# Patient Record
Sex: FEMALE | Race: WHITE | NOT HISPANIC OR LATINO | Employment: FULL TIME | URBAN - METROPOLITAN AREA
[De-identification: names, ages, dates, MRNs, and addresses within clinical notes are randomized per-mention and may not be internally consistent; named-entity substitution may affect disease eponyms.]

---

## 2018-06-12 ENCOUNTER — OFFICE VISIT (OUTPATIENT)
Dept: INTERNAL MEDICINE | Facility: CLINIC | Age: 28
End: 2018-06-12
Payer: COMMERCIAL

## 2018-06-12 ENCOUNTER — HOSPITAL ENCOUNTER (OUTPATIENT)
Dept: RADIOLOGY | Facility: HOSPITAL | Age: 28
Discharge: HOME | End: 2018-06-12
Attending: FAMILY MEDICINE
Payer: COMMERCIAL

## 2018-06-12 VITALS
WEIGHT: 143.75 LBS | DIASTOLIC BLOOD PRESSURE: 86 MMHG | RESPIRATION RATE: 20 BRPM | SYSTOLIC BLOOD PRESSURE: 138 MMHG | BODY MASS INDEX: 24.54 KG/M2 | TEMPERATURE: 99 F | HEIGHT: 64 IN | OXYGEN SATURATION: 100 % | HEART RATE: 100 BPM

## 2018-06-12 DIAGNOSIS — R00.0 RACING HEART BEAT: ICD-10-CM

## 2018-06-12 DIAGNOSIS — R10.2 PELVIC PAIN: ICD-10-CM

## 2018-06-12 DIAGNOSIS — K92.1 BLOOD IN STOOL: ICD-10-CM

## 2018-06-12 DIAGNOSIS — Z76.89 ESTABLISHING CARE WITH NEW DOCTOR, ENCOUNTER FOR: ICD-10-CM

## 2018-06-12 DIAGNOSIS — R10.30 LOWER ABDOMINAL PAIN: Primary | ICD-10-CM

## 2018-06-12 DIAGNOSIS — R10.30 LOWER ABDOMINAL PAIN: ICD-10-CM

## 2018-06-12 PROCEDURE — 74019 RADEX ABDOMEN 2 VIEWS: CPT | Mod: 26,,, | Performed by: RADIOLOGY

## 2018-06-12 PROCEDURE — 74019 RADEX ABDOMEN 2 VIEWS: CPT | Mod: TC

## 2018-06-12 PROCEDURE — 3008F BODY MASS INDEX DOCD: CPT | Mod: S$GLB,,, | Performed by: FAMILY MEDICINE

## 2018-06-12 PROCEDURE — 99999 PR PBB SHADOW E&M-NEW PATIENT-LVL IV: CPT | Mod: PBBFAC,,, | Performed by: FAMILY MEDICINE

## 2018-06-12 PROCEDURE — 99203 OFFICE O/P NEW LOW 30 MIN: CPT | Mod: S$GLB,,, | Performed by: FAMILY MEDICINE

## 2018-06-12 RX ORDER — PROMETHAZINE HYDROCHLORIDE 12.5 MG/1
TABLET ORAL
COMMUNITY
Start: 2018-02-20 | End: 2021-09-20

## 2018-06-12 RX ORDER — DICLOFENAC SODIUM 50 MG/1
TABLET, DELAYED RELEASE ORAL
Refills: 2 | COMMUNITY
Start: 2018-05-14 | End: 2021-09-20

## 2018-06-12 RX ORDER — MILNACIPRAN HYDROCHLORIDE 50 MG/1
TABLET, FILM COATED ORAL
Refills: 2 | COMMUNITY
Start: 2018-06-07 | End: 2020-09-08

## 2018-06-12 RX ORDER — AMITRIPTYLINE HYDROCHLORIDE 50 MG/1
50 TABLET, FILM COATED ORAL
COMMUNITY
End: 2022-05-10 | Stop reason: SDUPTHER

## 2018-06-12 RX ORDER — CIPROFLOXACIN 250 MG/1
TABLET, FILM COATED ORAL
Refills: 0 | COMMUNITY
Start: 2018-06-08 | End: 2018-08-23 | Stop reason: ALTCHOICE

## 2018-06-12 RX ORDER — OSELTAMIVIR PHOSPHATE 75 MG/1
CAPSULE ORAL
Refills: 0 | COMMUNITY
Start: 2018-05-14 | End: 2018-08-23 | Stop reason: ALTCHOICE

## 2018-06-12 RX ORDER — CHOLECALCIFEROL (VITAMIN D3) 25 MCG
2000 TABLET ORAL
COMMUNITY
End: 2018-11-16

## 2018-06-12 NOTE — PROGRESS NOTES
Subjective:       Patient ID: Shagufta Bedoya is a 27 y.o. female.    Chief Complaint: Establish Care (bleeding w bm)    HPI Ms. Bedoya presents today to establish care. She has a medical history as listed below.   She complains today of blood with BM.   Bright red blood     Had a menstrual 2 weeks long afterwards continued to have cramps. Intensity comes and goes but is always there.     Normal BM a week and a half. Last time was difficult to come.   Last couple weeks felt it cut coming out blood on tissue. 1 week ago some drips of blood.   Yesterday toilet filled with blood checked to make sure if it was menstrual and it wasn't.   Small stool last night and blood in toilet.     Miralax daily since last week stopped for 3 days and restarted. Stool softener 2 nights a go.     GI appt is 6/25/2018  Dr. Cardona    [facility] scheduled 22nd of this month     Review of Systems   Constitutional: Negative for activity change, appetite change, fatigue and fever.   HENT: Negative for congestion, ear pain and sinus pressure.    Eyes: Negative for pain and visual disturbance.   Respiratory: Negative for cough, chest tightness and wheezing.    Cardiovascular: Negative for chest pain, palpitations and leg swelling.   Gastrointestinal: Negative for abdominal distention, abdominal pain, constipation, diarrhea, nausea and vomiting.   Endocrine: Negative for polydipsia and polyuria.   Genitourinary: Negative for difficulty urinating, dyspareunia, dysuria, flank pain and hematuria.   Musculoskeletal: Negative for arthralgias and back pain.   Skin: Negative for rash.   Neurological: Negative for dizziness, tremors, syncope, weakness, numbness and headaches.   Psychiatric/Behavioral: Negative for agitation and confusion.         Past Medical History:   Diagnosis Date    Anemia     Fibromyalgia 2015     History reviewed. No pertinent surgical history.  Family History   Problem Relation Age of Onset    Cancer Mother          cervical     Cancer Sister         cervical     Social History     Social History    Marital status:      Spouse name: N/A    Number of children: N/A    Years of education: N/A     Social History Main Topics    Smoking status: Never Smoker    Smokeless tobacco: Never Used    Alcohol use Yes      Comment: occasional     Drug use: No    Sexual activity: Yes     Birth control/ protection: Implant     Other Topics Concern    None     Social History Narrative    None       Objective:        Physical Exam   Constitutional: She is oriented to person, place, and time. She appears well-nourished. No distress.   HENT:   Head: Normocephalic and atraumatic.   Right Ear: External ear normal.   Left Ear: External ear normal.   Nose: Nose normal.   Mouth/Throat: Oropharynx is clear and moist.   Eyes: EOM are normal. Pupils are equal, round, and reactive to light. Right eye exhibits no discharge. Left eye exhibits no discharge.   Neck: Normal range of motion. Neck supple. No thyromegaly present.   Cardiovascular: Normal rate, regular rhythm and normal heart sounds.    No murmur heard.  Pulmonary/Chest: Effort normal and breath sounds normal. No respiratory distress. She has no wheezes.   Abdominal: Soft. Bowel sounds are normal. She exhibits no distension. There is no tenderness.   Musculoskeletal: Normal range of motion. She exhibits no edema.   Neurological: She is alert and oriented to person, place, and time. Coordination normal.   Skin: Skin is warm and dry. No rash noted.   Psychiatric: She has a normal mood and affect. Her behavior is normal.   Nursing note and vitals reviewed.        No results found for this or any previous visit.    Assessment/Plan:     Lower abdominal pain  -     X-Ray Abdomen Flat And Erect; Future; Expected date: 06/12/2018  -     Cancel: US Pelvis Complete Non OB; Future; Expected date: 06/12/2018  -     US Pelvis Comp with Transvag NON-OB (xpd); Future; Expected date:  06/13/2018    Blood in stool  -     CBC auto differential; Future; Expected date: 06/12/2018    Pelvic pain  -     Cancel: US Pelvis Complete Non OB; Future; Expected date: 06/12/2018  -     US Pelvis Comp with Transvag NON-OB (xpd); Future; Expected date: 06/13/2018    Racing heart beat  -     Comprehensive metabolic panel; Future; Expected date: 06/12/2018  -     TSH; Future; Expected date: 06/12/2018  -     T4; Future; Expected date: 06/12/2018  -     T3; Future; Expected date: 06/12/2018    Establishing care with new doctor, encounter for  Reviewed medical, social, surgical and family history. Reviewed health maintenance.   Patient has concerns for unexplained pain in the lower abdomen. f amily history of cancer (cervical)        Follow-up if symptoms worsen or fail to improve.    Anna Fields MD  [facility]   Family Medicine

## 2018-06-13 ENCOUNTER — HOSPITAL ENCOUNTER (OUTPATIENT)
Dept: RADIOLOGY | Facility: HOSPITAL | Age: 28
Discharge: HOME | End: 2018-06-13
Attending: FAMILY MEDICINE
Payer: COMMERCIAL

## 2018-06-13 ENCOUNTER — TELEPHONE (OUTPATIENT)
Dept: INTERNAL MEDICINE | Facility: CLINIC | Age: 28
End: 2018-06-13

## 2018-06-13 ENCOUNTER — PATIENT MESSAGE (OUTPATIENT)
Dept: INTERNAL MEDICINE | Facility: CLINIC | Age: 28
End: 2018-06-13

## 2018-06-13 DIAGNOSIS — R10.2 PELVIC PAIN: ICD-10-CM

## 2018-06-13 DIAGNOSIS — D50.8 OTHER IRON DEFICIENCY ANEMIA: Primary | ICD-10-CM

## 2018-06-13 DIAGNOSIS — R10.30 LOWER ABDOMINAL PAIN: ICD-10-CM

## 2018-06-13 PROCEDURE — 76830 TRANSVAGINAL US NON-OB: CPT | Mod: 26,,, | Performed by: RADIOLOGY

## 2018-06-13 PROCEDURE — 76830 TRANSVAGINAL US NON-OB: CPT | Mod: TC,PO

## 2018-06-13 PROCEDURE — 76856 US EXAM PELVIC COMPLETE: CPT | Mod: 26,,, | Performed by: RADIOLOGY

## 2018-06-13 NOTE — TELEPHONE ENCOUNTER
----- Message from Anna Fields MD sent at 6/13/2018  3:51 PM CDT -----  Normal labs except CBC   There was a slight anemia. I would like to repeat in a few days to see if this is stable or decreasing

## 2018-06-14 ENCOUNTER — PATIENT MESSAGE (OUTPATIENT)
Dept: INTERNAL MEDICINE | Facility: CLINIC | Age: 28
End: 2018-06-14

## 2018-06-14 ENCOUNTER — TELEPHONE (OUTPATIENT)
Dept: INTERNAL MEDICINE | Facility: CLINIC | Age: 28
End: 2018-06-14

## 2018-06-14 NOTE — TELEPHONE ENCOUNTER
----- Message from Anna Fields MD sent at 6/14/2018 10:48 AM CDT -----  Complex cyst on left ovary. Are we seeing GYN soon?

## 2018-06-14 NOTE — TELEPHONE ENCOUNTER
Notified pt of results. Pt is able to view the on [facility]. She does have an appt with Dr Jolie Cruz on 06/22/18. Advised her to keep that appt and I will send them a copy of the US report. Pt agreed to plan and verbalized understanding.

## 2018-06-18 ENCOUNTER — PATIENT MESSAGE (OUTPATIENT)
Dept: INTERNAL MEDICINE | Facility: CLINIC | Age: 28
End: 2018-06-18

## 2018-06-18 DIAGNOSIS — B37.9 YEAST INFECTION: Primary | ICD-10-CM

## 2018-06-18 RX ORDER — FLUCONAZOLE 200 MG/1
200 TABLET ORAL DAILY
Qty: 3 TABLET | Refills: 0 | Status: SHIPPED | OUTPATIENT
Start: 2018-06-18 | End: 2018-07-18

## 2018-08-23 ENCOUNTER — OFFICE VISIT (OUTPATIENT)
Dept: INTERNAL MEDICINE | Facility: CLINIC | Age: 28
End: 2018-08-23
Payer: COMMERCIAL

## 2018-08-23 VITALS
OXYGEN SATURATION: 98 % | HEIGHT: 64 IN | DIASTOLIC BLOOD PRESSURE: 74 MMHG | SYSTOLIC BLOOD PRESSURE: 106 MMHG | TEMPERATURE: 98 F | HEART RATE: 110 BPM | BODY MASS INDEX: 22.96 KG/M2 | WEIGHT: 134.5 LBS

## 2018-08-23 DIAGNOSIS — R10.84 GENERALIZED ABDOMINAL PAIN: ICD-10-CM

## 2018-08-23 DIAGNOSIS — R11.2 INTRACTABLE VOMITING WITH NAUSEA, UNSPECIFIED VOMITING TYPE: ICD-10-CM

## 2018-08-23 DIAGNOSIS — R10.13 EPIGASTRIC PAIN: Primary | ICD-10-CM

## 2018-08-23 PROCEDURE — 3008F BODY MASS INDEX DOCD: CPT | Mod: S$GLB,,, | Performed by: FAMILY MEDICINE

## 2018-08-23 PROCEDURE — 99999 PR PBB SHADOW E&M-EST. PATIENT-LVL III: CPT | Mod: PBBFAC,,, | Performed by: FAMILY MEDICINE

## 2018-08-23 PROCEDURE — 99213 OFFICE O/P EST LOW 20 MIN: CPT | Mod: S$GLB,,, | Performed by: FAMILY MEDICINE

## 2018-08-23 RX ORDER — PANTOPRAZOLE SODIUM 40 MG/1
40 TABLET, DELAYED RELEASE ORAL DAILY
Qty: 60 TABLET | Refills: 0 | Status: SHIPPED | OUTPATIENT
Start: 2018-08-23 | End: 2018-10-19 | Stop reason: SDUPTHER

## 2018-08-23 RX ORDER — ONDANSETRON HYDROCHLORIDE 8 MG/1
8 TABLET, FILM COATED ORAL EVERY 8 HOURS PRN
Qty: 12 TABLET | Refills: 1 | Status: SHIPPED | OUTPATIENT
Start: 2018-08-23 | End: 2018-11-16

## 2018-08-23 NOTE — PROGRESS NOTES
Subjective:       Patient ID: Shagufta Bedoya is a 27 y.o. female.    Chief Complaint: Abdominal Pain    HPI Ms. Eagle presents today for abdominal pain. She reports that her sons stomach bug ended on Sunday, he had diarrhea. Ms. Bedoya's started Sunday with vomiting. Hasn't been able to eat much, she ate bread and cheese on Tuesday, sweet potato fries last night and she has had crackers, water and ginger ale.     Vomited Sunday night and still having pain.   Pain while vomiting was sharp but this is different. More of a sharp burning feeling that comes and goes. Took 4 Tums last night and it didn't help with intensity of coming and going but the constant hurt.   Took a different antacid this morning and it helped some.     Upper middle abdomen.     Review of Systems   Constitutional: Negative.    Respiratory: Negative.    Cardiovascular: Negative.    Gastrointestinal: Positive for abdominal pain and vomiting.   Musculoskeletal: Negative.          Objective:        Physical Exam   Constitutional: She is oriented to person, place, and time. She appears well-developed and well-nourished.   HENT:   Head: Normocephalic and atraumatic.   Abdominal: Soft. Bowel sounds are normal. She exhibits no distension. There is no tenderness (uncomfortable on palpation).   Neurological: She is alert and oriented to person, place, and time.   Psychiatric: She has a normal mood and affect. Her behavior is normal.   Vitals reviewed.        Assessment/Plan:     Epigastric pain  -     X-Ray Abdomen Flat And Erect; Future; Expected date: 08/23/2018  -     pantoprazole (PROTONIX) 40 MG tablet; Take 1 tablet (40 mg total) by mouth once daily.  Dispense: 60 tablet; Refill: 0    Generalized abdominal pain  -     X-Ray Abdomen Flat And Erect; Future; Expected date: 08/23/2018  -     pantoprazole (PROTONIX) 40 MG tablet; Take 1 tablet (40 mg total) by mouth once daily.  Dispense: 60 tablet; Refill: 0    Intractable vomiting with nausea, unspecified  vomiting type  -     ondansetron (ZOFRAN) 8 MG tablet; Take 1 tablet (8 mg total) by mouth every 8 (eight) hours as needed for Nausea.  Dispense: 12 tablet; Refill: 1    follow up in 1-2 weeks if symptoms not better.     Follow-up if symptoms worsen or fail to improve.    Anna Fields MD  [facility]

## 2018-08-27 ENCOUNTER — PATIENT MESSAGE (OUTPATIENT)
Dept: INTERNAL MEDICINE | Facility: CLINIC | Age: 28
End: 2018-08-27

## 2018-08-27 RX ORDER — HYOSCYAMINE SULFATE 0.125 MG
125 TABLET ORAL EVERY 4 HOURS PRN
Qty: 12 TABLET | Refills: 0 | Status: SHIPPED | OUTPATIENT
Start: 2018-08-27 | End: 2018-08-28 | Stop reason: SDUPTHER

## 2018-08-28 RX ORDER — HYOSCYAMINE SULFATE 0.125 MG
TABLET ORAL
Qty: 12 TABLET | Refills: 0 | Status: SHIPPED | OUTPATIENT
Start: 2018-08-28 | End: 2018-11-16

## 2018-09-11 ENCOUNTER — PATIENT MESSAGE (OUTPATIENT)
Dept: INTERNAL MEDICINE | Facility: CLINIC | Age: 28
End: 2018-09-11

## 2018-09-11 DIAGNOSIS — Z77.098 CHEMICAL EXPOSURE: Primary | ICD-10-CM

## 2018-09-11 NOTE — TELEPHONE ENCOUNTER
Patient is requesting order for blood work to be done due to a high exposure to benzene inhalation. Please place orders

## 2018-09-12 ENCOUNTER — LAB VISIT (OUTPATIENT)
Dept: LAB | Facility: HOSPITAL | Age: 28
End: 2018-09-12
Attending: FAMILY MEDICINE
Payer: COMMERCIAL

## 2018-09-12 ENCOUNTER — OFFICE VISIT (OUTPATIENT)
Dept: INTERNAL MEDICINE | Facility: CLINIC | Age: 28
End: 2018-09-12
Attending: FAMILY MEDICINE
Payer: COMMERCIAL

## 2018-09-12 VITALS
TEMPERATURE: 97 F | HEART RATE: 84 BPM | WEIGHT: 131.81 LBS | DIASTOLIC BLOOD PRESSURE: 90 MMHG | SYSTOLIC BLOOD PRESSURE: 130 MMHG | HEIGHT: 64 IN | BODY MASS INDEX: 22.5 KG/M2 | OXYGEN SATURATION: 98 % | RESPIRATION RATE: 16 BRPM

## 2018-09-12 DIAGNOSIS — Z77.021: Primary | ICD-10-CM

## 2018-09-12 DIAGNOSIS — R53.1 WEAKNESS: ICD-10-CM

## 2018-09-12 DIAGNOSIS — R53.83 OTHER FATIGUE: ICD-10-CM

## 2018-09-12 DIAGNOSIS — R50.9 LOW GRADE FEVER: ICD-10-CM

## 2018-09-12 DIAGNOSIS — R00.0 RACING HEART BEAT: ICD-10-CM

## 2018-09-12 DIAGNOSIS — R42 DIZZINESS: ICD-10-CM

## 2018-09-12 DIAGNOSIS — G44.211 INTRACTABLE EPISODIC TENSION-TYPE HEADACHE: ICD-10-CM

## 2018-09-12 DIAGNOSIS — Z77.021: ICD-10-CM

## 2018-09-12 LAB
BASOPHILS # BLD AUTO: 0.02 K/UL
BASOPHILS NFR BLD: 0.3 %
DIFFERENTIAL METHOD: ABNORMAL
EOSINOPHIL # BLD AUTO: 0 K/UL
EOSINOPHIL NFR BLD: 0.6 %
ERYTHROCYTE [DISTWIDTH] IN BLOOD BY AUTOMATED COUNT: 15.4 %
HCT VFR BLD AUTO: 34 %
HGB BLD-MCNC: 10.3 G/DL
IMM GRANULOCYTES # BLD AUTO: 0.02 K/UL
IMM GRANULOCYTES NFR BLD AUTO: 0.3 %
LYMPHOCYTES # BLD AUTO: 2.9 K/UL
LYMPHOCYTES NFR BLD: 43.4 %
MCH RBC QN AUTO: 23.3 PG
MCHC RBC AUTO-ENTMCNC: 30.3 G/DL
MCV RBC AUTO: 77 FL
MONOCYTES # BLD AUTO: 0.4 K/UL
MONOCYTES NFR BLD: 5.6 %
NEUTROPHILS # BLD AUTO: 3.3 K/UL
NEUTROPHILS NFR BLD: 49.8 %
NRBC BLD-RTO: 0 /100 WBC
PLATELET # BLD AUTO: 259 K/UL
PMV BLD AUTO: 10.7 FL
RBC # BLD AUTO: 4.43 M/UL
WBC # BLD AUTO: 6.63 K/UL

## 2018-09-12 PROCEDURE — 84600 ASSAY OF VOLATILES: CPT

## 2018-09-12 PROCEDURE — 36415 COLL VENOUS BLD VENIPUNCTURE: CPT

## 2018-09-12 PROCEDURE — 99999 PR PBB SHADOW E&M-EST. PATIENT-LVL III: CPT | Mod: PBBFAC,,, | Performed by: FAMILY MEDICINE

## 2018-09-12 PROCEDURE — 99214 OFFICE O/P EST MOD 30 MIN: CPT | Mod: S$GLB,,, | Performed by: FAMILY MEDICINE

## 2018-09-12 PROCEDURE — 3008F BODY MASS INDEX DOCD: CPT | Mod: S$GLB,,, | Performed by: FAMILY MEDICINE

## 2018-09-12 PROCEDURE — 85025 COMPLETE CBC W/AUTO DIFF WBC: CPT

## 2018-09-12 NOTE — PROGRESS NOTES
Subjective:       Patient ID: Shagufta Bedoya is a 28 y.o. female.    Chief Complaint: possible benzene exposure    Bishop Bedoya presents today with concerns for benzene exposure. She and her  had a new home built that was completed around the end of February.   Not sure if she has had an exposure but has noted various symptoms since moving into the home. Her  mother in law came to visit and complained of throat burning. So she had a test done and it was positive for benzene(s).    Ms. Bedoya then begin wondering if this could have anything to do with medical complaints that she has had since moving into her home. She has noted some weakness and sluggishness and is concerned that exposure could have contributed to a decreased immune system.     We have been having a lot of rain and thinks more exposure.   She went on Sunday-Sunday night and Monday chest burning. Zrytec helped that night   Chest pain again on Tuesday     She and  went to reset security alarm and she has noted burning in the chest and throat. .     not complaining of symptoms.     Noticed on last Saturday went to the house on Sunday. Ulcer like lesion on the back left tonsil   Hurts to swallow.     Immune system affected    Review of Systems   Constitutional: Negative for activity change and unexpected weight change.   HENT: Negative for hearing loss, rhinorrhea and trouble swallowing.    Eyes: Negative for discharge and visual disturbance.   Respiratory: Positive for chest tightness. Negative for wheezing.    Cardiovascular: Positive for chest pain and palpitations.   Gastrointestinal: Negative for blood in stool, constipation, diarrhea and vomiting.   Endocrine: Negative for polydipsia and polyuria.   Genitourinary: Negative for difficulty urinating, dysuria, hematuria and menstrual problem.   Musculoskeletal: Negative for arthralgias, joint swelling and neck pain.   Neurological: Negative for weakness and headaches.    Psychiatric/Behavioral: Negative for confusion and dysphoric mood.         Past Medical History:   Diagnosis Date    Anemia     Fibromyalgia 2015     Objective:        Physical Exam   Constitutional: She is oriented to person, place, and time. She appears well-developed and well-nourished.   HENT:   Head: Normocephalic and atraumatic.   Right Ear: External ear normal.   Left Ear: External ear normal.   Ulcer like lesion on the left posterior oropharynx    Eyes: EOM are normal. Pupils are equal, round, and reactive to light.   Neck: Normal range of motion. Neck supple.   Cardiovascular: Normal heart sounds.   Pulmonary/Chest: Effort normal and breath sounds normal.   Musculoskeletal: Normal range of motion.   Neurological: She is alert and oriented to person, place, and time.   Vitals reviewed.        Assessment/Plan:     Suspected exposure to benzene  -     BENZENE, BLOOD; Future; Expected date: 09/12/2018  Reviewing previous visit, complaints and outside compliant that she has been seen by a provider for see care everywhere    Low grade fever  Weakness  Other fatigue  -     CBC auto differential; Future; Expected date: 09/12/2018    Dizziness  Intractable episodic tension-type headache  Racing heart beat  All of the above diagnosis has been noted since moving in to her home. Not all at the same time and they come and go.   Will follow up on lab work. Patient does not continue to live in she and her husbands home.   Best to continue to avoid and significantly limit potential exposure.     Follow-up if symptoms worsen or fail to improve.    Anna Fields MD  [facility]   Family Medicine

## 2018-09-21 ENCOUNTER — PATIENT MESSAGE (OUTPATIENT)
Dept: INTERNAL MEDICINE | Facility: CLINIC | Age: 28
End: 2018-09-21

## 2018-09-25 LAB — BENZENE: <0.04 MG/L

## 2018-10-19 DIAGNOSIS — R10.84 GENERALIZED ABDOMINAL PAIN: ICD-10-CM

## 2018-10-19 DIAGNOSIS — R10.13 EPIGASTRIC PAIN: ICD-10-CM

## 2018-10-20 RX ORDER — PANTOPRAZOLE SODIUM 40 MG/1
TABLET, DELAYED RELEASE ORAL
Qty: 60 TABLET | Refills: 0 | Status: SHIPPED | OUTPATIENT
Start: 2018-10-20 | End: 2018-11-16

## 2018-11-02 ENCOUNTER — PATIENT MESSAGE (OUTPATIENT)
Dept: INTERNAL MEDICINE | Facility: CLINIC | Age: 28
End: 2018-11-02

## 2018-11-13 ENCOUNTER — PATIENT MESSAGE (OUTPATIENT)
Dept: INTERNAL MEDICINE | Facility: CLINIC | Age: 28
End: 2018-11-13

## 2018-11-13 DIAGNOSIS — R53.83 OTHER FATIGUE: Primary | ICD-10-CM

## 2018-11-14 ENCOUNTER — LAB VISIT (OUTPATIENT)
Dept: LAB | Facility: HOSPITAL | Age: 28
End: 2018-11-14
Attending: FAMILY MEDICINE
Payer: COMMERCIAL

## 2018-11-14 DIAGNOSIS — R53.83 OTHER FATIGUE: ICD-10-CM

## 2018-11-14 LAB
BASOPHILS # BLD AUTO: 0.07 K/UL
BASOPHILS NFR BLD: 0.8 %
DIFFERENTIAL METHOD: ABNORMAL
EOSINOPHIL # BLD AUTO: 0.1 K/UL
EOSINOPHIL NFR BLD: 0.8 %
ERYTHROCYTE [DISTWIDTH] IN BLOOD BY AUTOMATED COUNT: 14.9 %
HCT VFR BLD AUTO: 34.2 %
HGB BLD-MCNC: 10 G/DL
IMM GRANULOCYTES # BLD AUTO: 0.03 K/UL
IMM GRANULOCYTES NFR BLD AUTO: 0.3 %
LYMPHOCYTES # BLD AUTO: 3.5 K/UL
LYMPHOCYTES NFR BLD: 39.9 %
MCH RBC QN AUTO: 21.8 PG
MCHC RBC AUTO-ENTMCNC: 29.2 G/DL
MCV RBC AUTO: 75 FL
MONOCYTES # BLD AUTO: 0.5 K/UL
MONOCYTES NFR BLD: 5.9 %
NEUTROPHILS # BLD AUTO: 4.5 K/UL
NEUTROPHILS NFR BLD: 52.3 %
NRBC BLD-RTO: 0 /100 WBC
PLATELET # BLD AUTO: 354 K/UL
PMV BLD AUTO: 10.2 FL
RBC # BLD AUTO: 4.58 M/UL
RETICS/RBC NFR AUTO: 0.8 %
WBC # BLD AUTO: 8.68 K/UL

## 2018-11-14 PROCEDURE — 85025 COMPLETE CBC W/AUTO DIFF WBC: CPT

## 2018-11-14 PROCEDURE — 36415 COLL VENOUS BLD VENIPUNCTURE: CPT

## 2018-11-14 PROCEDURE — 85045 AUTOMATED RETICULOCYTE COUNT: CPT

## 2018-11-16 ENCOUNTER — OFFICE VISIT (OUTPATIENT)
Dept: INTERNAL MEDICINE | Facility: CLINIC | Age: 28
End: 2018-11-16
Payer: COMMERCIAL

## 2018-11-16 ENCOUNTER — LAB VISIT (OUTPATIENT)
Dept: LAB | Facility: HOSPITAL | Age: 28
End: 2018-11-16
Attending: FAMILY MEDICINE
Payer: COMMERCIAL

## 2018-11-16 VITALS
OXYGEN SATURATION: 99 % | DIASTOLIC BLOOD PRESSURE: 82 MMHG | WEIGHT: 137.13 LBS | HEART RATE: 134 BPM | BODY MASS INDEX: 23.41 KG/M2 | TEMPERATURE: 98 F | SYSTOLIC BLOOD PRESSURE: 118 MMHG | HEIGHT: 64 IN

## 2018-11-16 DIAGNOSIS — Z77.098 CHEMICAL EXPOSURE: ICD-10-CM

## 2018-11-16 DIAGNOSIS — R10.30 LOWER ABDOMINAL PAIN: ICD-10-CM

## 2018-11-16 DIAGNOSIS — F41.9 ANXIETY: ICD-10-CM

## 2018-11-16 DIAGNOSIS — R10.30 LOWER ABDOMINAL PAIN: Primary | ICD-10-CM

## 2018-11-16 PROCEDURE — 82656 EL-1 FECAL QUAL/SEMIQ: CPT

## 2018-11-16 PROCEDURE — 99999 PR PBB SHADOW E&M-EST. PATIENT-LVL III: CPT | Mod: PBBFAC,,, | Performed by: FAMILY MEDICINE

## 2018-11-16 PROCEDURE — 83516 IMMUNOASSAY NONANTIBODY: CPT

## 2018-11-16 PROCEDURE — 3008F BODY MASS INDEX DOCD: CPT | Mod: S$GLB,,, | Performed by: FAMILY MEDICINE

## 2018-11-16 PROCEDURE — 36415 COLL VENOUS BLD VENIPUNCTURE: CPT

## 2018-11-16 PROCEDURE — 99214 OFFICE O/P EST MOD 30 MIN: CPT | Mod: S$GLB,,, | Performed by: FAMILY MEDICINE

## 2018-11-16 PROCEDURE — 84600 ASSAY OF VOLATILES: CPT

## 2018-11-16 PROCEDURE — 86255 FLUORESCENT ANTIBODY SCREEN: CPT

## 2018-11-16 RX ORDER — ALPRAZOLAM 0.25 MG/1
0.25 TABLET ORAL 2 TIMES DAILY PRN
Qty: 60 TABLET | Refills: 0 | Status: SHIPPED | OUTPATIENT
Start: 2018-11-16 | End: 2021-09-20

## 2018-11-16 NOTE — PROGRESS NOTES
Subjective:       Patient ID: Shagufta Bedoya is a 28 y.o. female.    Chief Complaint: Celiac Disease (requests labs/test for it) and Anxiety    HPI Ms. Bedoya presents today for anxiety and lab work.     Anxiety started a few years ago prior to medication for her fibromyalgia.   Panic attacks when she has increase stress. Example going to work in [place] -not her normal  Flushed, increased heart rate  Took medication as needed.   Had panic attack yesterday, she still feels off from yesterday. She took something in the past and is not sure what it was but it was as needed.     She would also like labs for celiac. She had eliminated certain things containing wheat and gliadin from her diet and when she reintroduced them symptoms of abdominal pain returned.     Review of Systems   Constitutional: Negative.    HENT: Negative.    Respiratory: Negative.    Cardiovascular: Positive for palpitations (anxiety?).   Musculoskeletal: Positive for myalgias.   Neurological: Negative.    Psychiatric/Behavioral: Negative for self-injury, sleep disturbance and suicidal ideas. The patient is nervous/anxious.            Past Medical History:   Diagnosis Date    Anemia     Fibromyalgia 2015     Objective:        Physical Exam   Constitutional: She appears well-developed and well-nourished.   HENT:   Head: Normocephalic and atraumatic.   Abdominal: Soft. Bowel sounds are normal. She exhibits no distension. There is no tenderness.   Musculoskeletal: Normal range of motion.   Skin: Skin is warm.   Psychiatric: She has a normal mood and affect.   Vitals reviewed.      Assessment/Plan:     Lower abdominal pain  -     TISSUE TRANSGLUTAMINASE, IGA; Future; Expected date: 11/16/2018  -     Endomysial (EMA) Antibody (IgG) Titer; Future; Expected date: 11/16/2018    Anxiety  -     ALPRAZolam (XANAX) 0.25 MG tablet; Take 1 tablet (0.25 mg total) by mouth 2 (two) times daily as needed for Anxiety.  Dispense: 60 tablet; Refill: 0    Could not  find any information on [place]  [facility]. Patient to look and let us know what the medication was once she is able to return home and look at old bottle.       Follow-up in about 3 months (around 2/16/2019), or if symptoms worsen or fail to improve.    Anna Fields MD  [facility]   Family Medicine

## 2018-11-19 ENCOUNTER — PATIENT OUTREACH (OUTPATIENT)
Dept: ADMINISTRATIVE | Facility: HOSPITAL | Age: 28
End: 2018-11-19

## 2018-11-19 LAB — TTG IGA SER-ACNC: 4 UNITS

## 2018-11-20 ENCOUNTER — PATIENT MESSAGE (OUTPATIENT)
Dept: INTERNAL MEDICINE | Facility: CLINIC | Age: 28
End: 2018-11-20

## 2018-11-26 LAB
ENDOMYSIAL (EMA) ANTIBODY IGG TITER: NORMAL
ENDOMYSIAL (EMA) ANTIBODY IGG: NORMAL TITER

## 2018-11-30 LAB — BENZENE: <0.04 MG/L

## 2019-09-09 ENCOUNTER — PATIENT MESSAGE (OUTPATIENT)
Dept: INTERNAL MEDICINE | Facility: CLINIC | Age: 29
End: 2019-09-09

## 2019-09-11 ENCOUNTER — PATIENT MESSAGE (OUTPATIENT)
Dept: INTERNAL MEDICINE | Facility: CLINIC | Age: 29
End: 2019-09-11

## 2019-09-11 DIAGNOSIS — Z77.098 CHEMICAL EXPOSURE: Primary | ICD-10-CM

## 2019-09-12 ENCOUNTER — LAB VISIT (OUTPATIENT)
Dept: LAB | Facility: HOSPITAL | Age: 29
End: 2019-09-12
Attending: FAMILY MEDICINE
Payer: COMMERCIAL

## 2019-09-12 DIAGNOSIS — Z77.098 CHEMICAL EXPOSURE: ICD-10-CM

## 2019-09-12 LAB
BASOPHILS # BLD AUTO: 0.04 K/UL (ref 0–0.2)
BASOPHILS NFR BLD: 0.5 % (ref 0–1.9)
DIFFERENTIAL METHOD: ABNORMAL
EOSINOPHIL # BLD AUTO: 0.1 K/UL (ref 0–0.5)
EOSINOPHIL NFR BLD: 0.7 % (ref 0–8)
ERYTHROCYTE [DISTWIDTH] IN BLOOD BY AUTOMATED COUNT: 18.4 % (ref 11.5–14.5)
HCT VFR BLD AUTO: 34.7 % (ref 37–48.5)
HGB BLD-MCNC: 10.4 G/DL (ref 12–16)
IMM GRANULOCYTES # BLD AUTO: 0.02 K/UL (ref 0–0.04)
IMM GRANULOCYTES NFR BLD AUTO: 0.3 % (ref 0–0.5)
LYMPHOCYTES # BLD AUTO: 2.9 K/UL (ref 1–4.8)
LYMPHOCYTES NFR BLD: 38.1 % (ref 18–48)
MCH RBC QN AUTO: 24.2 PG (ref 27–31)
MCHC RBC AUTO-ENTMCNC: 30 G/DL (ref 32–36)
MCV RBC AUTO: 81 FL (ref 82–98)
MONOCYTES # BLD AUTO: 0.5 K/UL (ref 0.3–1)
MONOCYTES NFR BLD: 6.5 % (ref 4–15)
NEUTROPHILS # BLD AUTO: 4.1 K/UL (ref 1.8–7.7)
NEUTROPHILS NFR BLD: 53.9 % (ref 38–73)
NRBC BLD-RTO: 0 /100 WBC
PLATELET # BLD AUTO: 262 K/UL (ref 150–350)
PMV BLD AUTO: 10.4 FL (ref 9.2–12.9)
RBC # BLD AUTO: 4.29 M/UL (ref 4–5.4)
RETICS/RBC NFR AUTO: 0.6 % (ref 0.5–2.5)
WBC # BLD AUTO: 7.59 K/UL (ref 3.9–12.7)

## 2019-09-12 PROCEDURE — 85025 COMPLETE CBC W/AUTO DIFF WBC: CPT

## 2019-09-12 PROCEDURE — 85045 AUTOMATED RETICULOCYTE COUNT: CPT

## 2019-09-12 PROCEDURE — 36415 COLL VENOUS BLD VENIPUNCTURE: CPT

## 2020-01-13 ENCOUNTER — PATIENT OUTREACH (OUTPATIENT)
Dept: ADMINISTRATIVE | Facility: HOSPITAL | Age: 30
End: 2020-01-13

## 2020-09-07 NOTE — PROGRESS NOTES
The patient location is: [place]   The chief complaint leading to consultation is: concentration deficit and anxiety    Visit type: audiovisual    Face to Face time with patient: 20 minutes  20 minutes of total time spent on the encounter, which includes face to face time and non-face to face time preparing to see the patient (eg, review of tests), Obtaining and/or reviewing separately obtained history, Documenting clinical information in the electronic or other health record, Independently interpreting results (not separately reported) and communicating results to the patient/family/caregiver, or Care coordination (not separately reported).       Each patient to whom he or she provides medical services by telemedicine is:  (1) informed of the relationship between the physician and patient and the respective role of any other health care provider with respect to management of the patient; and (2) notified that he or she may decline to receive medical services by telemedicine and may withdraw from such care at any time.    Subjective:       Patient ID: Shagufta Bedoya is a 30 y.o. female.    Chief Complaint: No chief complaint on file.    HPI Ms. Bedoya presents today via telemedicine to discuss lack of focus and anxiety.     She notes inabilty to stay on task.  She feels she has been all over the place.   Thinking process is off    She notes she is having problems with emails and sentence structure for example.   This occurs multiple times during the day.   Nothing has changed with sleep or medications.   She has noted this for for over 6 months.   She does recall having some issues with focus when she was in elementary school through college.     She has been on Xanax in the past which she takes rarely.   She has history of panic attacks and these are less  She is having more moments where she feels anxious but it hasn't gotten to a panic attack.   She notes this a lot more and would like something more time  released.     Review of Systems   Constitutional: Negative for activity change and unexpected weight change.   HENT: Negative for hearing loss, rhinorrhea and trouble swallowing.    Eyes: Positive for visual disturbance. Negative for discharge.   Respiratory: Positive for chest tightness. Negative for wheezing.    Cardiovascular: Positive for palpitations. Negative for chest pain.   Gastrointestinal: Negative for blood in stool, constipation, diarrhea and vomiting.   Endocrine: Negative for polydipsia and polyuria.   Genitourinary: Negative for difficulty urinating, dysuria, hematuria and menstrual problem.   Musculoskeletal: Negative for arthralgias, joint swelling and neck pain.   Neurological: Negative for weakness and headaches.   Psychiatric/Behavioral: Positive for confusion. Negative for dysphoric mood.           Past Medical History:   Diagnosis Date    Anemia     Fibromyalgia 2015     History reviewed. No pertinent surgical history.  Family History   Problem Relation Age of Onset    Cancer Mother         cervical     Cancer Sister         cervical     Social History     Socioeconomic History    Marital status:      Spouse name: Not on file    Number of children: Not on file    Years of education: Not on file    Highest education level: Not on file   Occupational History    Not on file   Social Needs    Financial resource strain: Not on file    Food insecurity     Worry: Not on file     Inability: Not on file    Transportation needs     Medical: Not on file     Non-medical: Not on file   Tobacco Use    Smoking status: Never Smoker    Smokeless tobacco: Never Used   Substance and Sexual Activity    Alcohol use: Yes     Comment: occasional     Drug use: No    Sexual activity: Yes     Birth control/protection: Implant   Lifestyle    Physical activity     Days per week: Not on file     Minutes per session: Not on file    Stress: Not on file   Relationships    Social connections      Talks on phone: Not on file     Gets together: Not on file     Attends Taoism service: Not on file     Active member of club or organization: Not on file     Attends meetings of clubs or organizations: Not on file     Relationship status: Not on file   Other Topics Concern    Not on file   Social History Narrative    Not on file       Objective:        Physical Exam  Vitals signs reviewed.   Constitutional:       Appearance: Normal appearance.   Pulmonary:      Effort: Pulmonary effort is normal.   Neurological:      Mental Status: She is alert and oriented to person, place, and time.   Psychiatric:         Mood and Affect: Mood normal.             Assessment/Plan:     Anxiety  -     clonazePAM (KLONOPIN) 0.25 MG TbDL; Take 1 tablet (0.25 mg total) by mouth 2 (two) times daily as needed (anxiety).  Dispense: 60 tablet; Refill: 0    Panic attacks  -     clonazePAM (KLONOPIN) 0.25 MG TbDL; Take 1 tablet (0.25 mg total) by mouth 2 (two) times daily as needed (anxiety).  Dispense: 60 tablet; Refill: 0    Attention or concentration deficit  -     Ambulatory referral/consult to Psychiatry; Future; Expected date: 09/15/2020  -     atomoxetine (STRATTERA) 40 MG capsule; Take 1 capsule (40 mg total) by mouth once daily.  Dispense: 30 capsule; Refill: 0      Discussed possible side effects and to use Klonopin sparingly.    [facility]  Will do trial of Strattera    Follow up as needed     Anna Fields MD  [facility]   Family Medicine

## 2020-09-08 ENCOUNTER — OFFICE VISIT (OUTPATIENT)
Dept: INTERNAL MEDICINE | Facility: CLINIC | Age: 30
End: 2020-09-08
Payer: COMMERCIAL

## 2020-09-08 DIAGNOSIS — R41.840 ATTENTION OR CONCENTRATION DEFICIT: ICD-10-CM

## 2020-09-08 DIAGNOSIS — F41.0 PANIC ATTACKS: ICD-10-CM

## 2020-09-08 DIAGNOSIS — F41.9 ANXIETY: Primary | ICD-10-CM

## 2020-09-08 PROCEDURE — 99213 PR OFFICE/OUTPT VISIT, EST, LEVL III, 20-29 MIN: ICD-10-PCS | Mod: 95,,, | Performed by: FAMILY MEDICINE

## 2020-09-08 PROCEDURE — 99213 OFFICE O/P EST LOW 20 MIN: CPT | Mod: 95,,, | Performed by: FAMILY MEDICINE

## 2020-09-08 RX ORDER — ATOMOXETINE 40 MG/1
40 CAPSULE ORAL DAILY
Qty: 30 CAPSULE | Refills: 0 | Status: SHIPPED | OUTPATIENT
Start: 2020-09-08 | End: 2020-09-30 | Stop reason: SDUPTHER

## 2020-09-08 RX ORDER — CLONAZEPAM 0.25 MG/1
0.25 TABLET, ORALLY DISINTEGRATING ORAL 2 TIMES DAILY PRN
Qty: 60 TABLET | Refills: 0 | Status: SHIPPED | OUTPATIENT
Start: 2020-09-08 | End: 2023-01-23

## 2020-09-08 RX ORDER — GABAPENTIN 100 MG/1
100 CAPSULE ORAL 3 TIMES DAILY
COMMUNITY
End: 2023-01-23

## 2020-09-29 ENCOUNTER — PATIENT MESSAGE (OUTPATIENT)
Dept: INTERNAL MEDICINE | Facility: CLINIC | Age: 30
End: 2020-09-29

## 2020-09-29 DIAGNOSIS — R41.840 ATTENTION OR CONCENTRATION DEFICIT: ICD-10-CM

## 2020-09-30 RX ORDER — ATOMOXETINE 60 MG/1
60 CAPSULE ORAL DAILY
Qty: 30 CAPSULE | Refills: 0 | Status: SHIPPED | OUTPATIENT
Start: 2020-09-30 | End: 2020-11-05 | Stop reason: SDUPTHER

## 2020-10-06 ENCOUNTER — PATIENT MESSAGE (OUTPATIENT)
Dept: ADMINISTRATIVE | Facility: HOSPITAL | Age: 30
End: 2020-10-06

## 2020-10-20 ENCOUNTER — PATIENT MESSAGE (OUTPATIENT)
Dept: INTERNAL MEDICINE | Facility: CLINIC | Age: 30
End: 2020-10-20

## 2020-10-20 ENCOUNTER — PATIENT MESSAGE (OUTPATIENT)
Dept: ADMINISTRATIVE | Facility: HOSPITAL | Age: 30
End: 2020-10-20

## 2020-10-22 ENCOUNTER — PATIENT MESSAGE (OUTPATIENT)
Dept: ADMINISTRATIVE | Facility: HOSPITAL | Age: 30
End: 2020-10-22

## 2020-11-05 DIAGNOSIS — R41.840 ATTENTION OR CONCENTRATION DEFICIT: ICD-10-CM

## 2020-11-05 RX ORDER — ATOMOXETINE 60 MG/1
60 CAPSULE ORAL DAILY
Qty: 30 CAPSULE | Refills: 0 | Status: SHIPPED | OUTPATIENT
Start: 2020-11-05 | End: 2020-12-09 | Stop reason: SDUPTHER

## 2020-11-09 ENCOUNTER — PATIENT OUTREACH (OUTPATIENT)
Dept: ADMINISTRATIVE | Facility: HOSPITAL | Age: 30
End: 2020-11-09

## 2020-11-18 ENCOUNTER — PATIENT MESSAGE (OUTPATIENT)
Dept: INTERNAL MEDICINE | Facility: CLINIC | Age: 30
End: 2020-11-18

## 2020-11-27 ENCOUNTER — PATIENT MESSAGE (OUTPATIENT)
Dept: ADMINISTRATIVE | Facility: OTHER | Age: 30
End: 2020-11-27

## 2020-12-09 DIAGNOSIS — R41.840 ATTENTION OR CONCENTRATION DEFICIT: ICD-10-CM

## 2020-12-09 RX ORDER — ATOMOXETINE 60 MG/1
60 CAPSULE ORAL DAILY
Qty: 30 CAPSULE | Refills: 0 | Status: SHIPPED | OUTPATIENT
Start: 2020-12-09 | End: 2021-09-20

## 2021-04-29 ENCOUNTER — PATIENT MESSAGE (OUTPATIENT)
Dept: RESEARCH | Facility: HOSPITAL | Age: 31
End: 2021-04-29

## 2021-09-17 ENCOUNTER — TELEPHONE (OUTPATIENT)
Dept: FAMILY MEDICINE | Facility: CLINIC | Age: 31
End: 2021-09-17

## 2021-09-20 ENCOUNTER — OFFICE VISIT (OUTPATIENT)
Dept: FAMILY MEDICINE | Facility: CLINIC | Age: 31
End: 2021-09-20
Payer: COMMERCIAL

## 2021-09-20 VITALS
BODY MASS INDEX: 20.25 KG/M2 | WEIGHT: 118.63 LBS | TEMPERATURE: 98 F | OXYGEN SATURATION: 98 % | SYSTOLIC BLOOD PRESSURE: 102 MMHG | HEIGHT: 64 IN | DIASTOLIC BLOOD PRESSURE: 58 MMHG | HEART RATE: 114 BPM

## 2021-09-20 DIAGNOSIS — E55.9 HYPOVITAMINOSIS D: ICD-10-CM

## 2021-09-20 DIAGNOSIS — N92.0 MENORRHAGIA WITH REGULAR CYCLE: ICD-10-CM

## 2021-09-20 DIAGNOSIS — F90.9 ADULT ADHD: Primary | ICD-10-CM

## 2021-09-20 DIAGNOSIS — M79.7 FIBROMYALGIA: ICD-10-CM

## 2021-09-20 DIAGNOSIS — F51.01 PRIMARY INSOMNIA: ICD-10-CM

## 2021-09-20 PROBLEM — D50.0 IRON DEFICIENCY ANEMIA DUE TO CHRONIC BLOOD LOSS: Status: ACTIVE | Noted: 2020-03-19

## 2021-09-20 LAB
AMP D-AMPHETAMINE 1000 NG/ML: POSITIVE
BAR SECOBARBITAL 300 NG/ML: NEGATIVE
BUP BUPRENORPHINE 10 NG/ML: NEGATIVE
BZO OXAZEPAM 300 NG/ML: NEGATIVE
COC BENZOYLECGONINE 300 NG/ML: NEGATIVE
CTP QC/QA: YES
MET D-METHAMPHETAMINE 500 NG/ML: NEGATIVE
MOP MORPHINE 300 NG/ML: NEGATIVE
MTD METHADONE 300 NG/ML: NEGATIVE
QXY OXYCODONE 100 NG/ML: NEGATIVE
THC 11-NOR-9-TETRAHYDROCANNABINOL-9-CARBOXYLIC ACID: NEGATIVE

## 2021-09-20 PROCEDURE — 3078F PR MOST RECENT DIASTOLIC BLOOD PRESSURE < 80 MM HG: ICD-10-PCS | Mod: S$GLB,,, | Performed by: FAMILY MEDICINE

## 2021-09-20 PROCEDURE — 3074F PR MOST RECENT SYSTOLIC BLOOD PRESSURE < 130 MM HG: ICD-10-PCS | Mod: S$GLB,,, | Performed by: FAMILY MEDICINE

## 2021-09-20 PROCEDURE — 3008F PR BODY MASS INDEX (BMI) DOCUMENTED: ICD-10-PCS | Mod: S$GLB,,, | Performed by: FAMILY MEDICINE

## 2021-09-20 PROCEDURE — 1159F MED LIST DOCD IN RCRD: CPT | Mod: S$GLB,,, | Performed by: FAMILY MEDICINE

## 2021-09-20 PROCEDURE — 1159F PR MEDICATION LIST DOCUMENTED IN MEDICAL RECORD: ICD-10-PCS | Mod: S$GLB,,, | Performed by: FAMILY MEDICINE

## 2021-09-20 PROCEDURE — 80305 POCT BUP URINE DRUG TEST: ICD-10-PCS | Mod: QW,S$GLB,, | Performed by: FAMILY MEDICINE

## 2021-09-20 PROCEDURE — 1160F PR REVIEW ALL MEDS BY PRESCRIBER/CLIN PHARMACIST DOCUMENTED: ICD-10-PCS | Mod: S$GLB,,, | Performed by: FAMILY MEDICINE

## 2021-09-20 PROCEDURE — 1160F RVW MEDS BY RX/DR IN RCRD: CPT | Mod: S$GLB,,, | Performed by: FAMILY MEDICINE

## 2021-09-20 PROCEDURE — 80305 DRUG TEST PRSMV DIR OPT OBS: CPT | Mod: QW,S$GLB,, | Performed by: FAMILY MEDICINE

## 2021-09-20 PROCEDURE — 99204 PR OFFICE/OUTPT VISIT, NEW, LEVL IV, 45-59 MIN: ICD-10-PCS | Mod: S$GLB,,, | Performed by: FAMILY MEDICINE

## 2021-09-20 PROCEDURE — 3008F BODY MASS INDEX DOCD: CPT | Mod: S$GLB,,, | Performed by: FAMILY MEDICINE

## 2021-09-20 PROCEDURE — 3074F SYST BP LT 130 MM HG: CPT | Mod: S$GLB,,, | Performed by: FAMILY MEDICINE

## 2021-09-20 PROCEDURE — 3078F DIAST BP <80 MM HG: CPT | Mod: S$GLB,,, | Performed by: FAMILY MEDICINE

## 2021-09-20 PROCEDURE — 99204 OFFICE O/P NEW MOD 45 MIN: CPT | Mod: S$GLB,,, | Performed by: FAMILY MEDICINE

## 2021-09-20 RX ORDER — KETOCONAZOLE 20 MG/ML
SHAMPOO, SUSPENSION TOPICAL
Qty: 120 ML | Refills: 11 | Status: SHIPPED | OUTPATIENT
Start: 2021-09-20 | End: 2022-02-23 | Stop reason: SDUPTHER

## 2021-09-20 RX ORDER — DEXTROAMPHETAMINE SACCHARATE, AMPHETAMINE ASPARTATE MONOHYDRATE, DEXTROAMPHETAMINE SULFATE AND AMPHETAMINE SULFATE 3.75; 3.75; 3.75; 3.75 MG/1; MG/1; MG/1; MG/1
15 CAPSULE, EXTENDED RELEASE ORAL DAILY
Qty: 30 CAPSULE | Refills: 0 | Status: SHIPPED | OUTPATIENT
Start: 2021-09-20 | End: 2021-10-15 | Stop reason: SDUPTHER

## 2021-09-20 RX ORDER — PANTOPRAZOLE SODIUM 40 MG/1
40 TABLET, DELAYED RELEASE ORAL DAILY
COMMUNITY
Start: 2021-09-11 | End: 2021-11-01 | Stop reason: SDUPTHER

## 2021-09-20 RX ORDER — DEXTROAMPHETAMINE SACCHARATE, AMPHETAMINE ASPARTATE MONOHYDRATE, DEXTROAMPHETAMINE SULFATE AND AMPHETAMINE SULFATE 2.5; 2.5; 2.5; 2.5 MG/1; MG/1; MG/1; MG/1
10 CAPSULE, EXTENDED RELEASE ORAL
COMMUNITY
End: 2021-09-20 | Stop reason: SDUPTHER

## 2021-09-20 RX ORDER — ESCITALOPRAM OXALATE 5 MG/1
5 TABLET ORAL DAILY
COMMUNITY
Start: 2021-09-11 | End: 2021-11-01 | Stop reason: SDUPTHER

## 2021-10-15 DIAGNOSIS — F90.9 ADULT ADHD: ICD-10-CM

## 2021-10-15 RX ORDER — DEXTROAMPHETAMINE SACCHARATE, AMPHETAMINE ASPARTATE MONOHYDRATE, DEXTROAMPHETAMINE SULFATE AND AMPHETAMINE SULFATE 3.75; 3.75; 3.75; 3.75 MG/1; MG/1; MG/1; MG/1
15 CAPSULE, EXTENDED RELEASE ORAL DAILY
Qty: 30 CAPSULE | Refills: 0 | Status: SHIPPED | OUTPATIENT
Start: 2021-10-15 | End: 2021-11-16 | Stop reason: SDUPTHER

## 2021-11-12 ENCOUNTER — LAB VISIT (OUTPATIENT)
Dept: LAB | Facility: CLINIC | Age: 31
End: 2021-11-12
Payer: COMMERCIAL

## 2021-11-12 DIAGNOSIS — F90.9 ADULT ADHD: ICD-10-CM

## 2021-11-12 DIAGNOSIS — M79.7 FIBROMYALGIA: ICD-10-CM

## 2021-11-12 DIAGNOSIS — N92.0 MENORRHAGIA WITH REGULAR CYCLE: ICD-10-CM

## 2021-11-12 DIAGNOSIS — E55.9 HYPOVITAMINOSIS D: ICD-10-CM

## 2021-11-12 LAB
ALBUMIN SERPL BCP-MCNC: 4.2 G/DL (ref 3.5–5.2)
ALP SERPL-CCNC: 55 U/L (ref 55–135)
ALT SERPL W/O P-5'-P-CCNC: 18 U/L (ref 10–44)
ANION GAP SERPL CALC-SCNC: 11 MMOL/L (ref 8–16)
AST SERPL-CCNC: 16 U/L (ref 10–40)
BASOPHILS # BLD AUTO: 0.04 K/UL (ref 0–0.2)
BASOPHILS NFR BLD: 0.6 % (ref 0–1.9)
BILIRUB SERPL-MCNC: 0.4 MG/DL (ref 0.1–1)
BUN SERPL-MCNC: 8 MG/DL (ref 6–20)
CALCIUM SERPL-MCNC: 9 MG/DL (ref 8.7–10.5)
CHLORIDE SERPL-SCNC: 106 MMOL/L (ref 95–110)
CO2 SERPL-SCNC: 22 MMOL/L (ref 23–29)
CREAT SERPL-MCNC: 0.8 MG/DL (ref 0.5–1.4)
DIFFERENTIAL METHOD: NORMAL
EOSINOPHIL # BLD AUTO: 0.1 K/UL (ref 0–0.5)
EOSINOPHIL NFR BLD: 0.8 % (ref 0–8)
ERYTHROCYTE [DISTWIDTH] IN BLOOD BY AUTOMATED COUNT: 12.5 % (ref 11.5–14.5)
EST. GFR  (AFRICAN AMERICAN): >60 ML/MIN/1.73 M^2
EST. GFR  (NON AFRICAN AMERICAN): >60 ML/MIN/1.73 M^2
FERRITIN SERPL-MCNC: 11 NG/ML (ref 20–300)
GLUCOSE SERPL-MCNC: 90 MG/DL (ref 70–110)
HCT VFR BLD AUTO: 39.1 % (ref 37–48.5)
HGB BLD-MCNC: 12.8 G/DL (ref 12–16)
IMM GRANULOCYTES # BLD AUTO: 0.02 K/UL (ref 0–0.04)
IMM GRANULOCYTES NFR BLD AUTO: 0.3 % (ref 0–0.5)
LYMPHOCYTES # BLD AUTO: 2.2 K/UL (ref 1–4.8)
LYMPHOCYTES NFR BLD: 35.6 % (ref 18–48)
MCH RBC QN AUTO: 28.8 PG (ref 27–31)
MCHC RBC AUTO-ENTMCNC: 32.7 G/DL (ref 32–36)
MCV RBC AUTO: 88 FL (ref 82–98)
MONOCYTES # BLD AUTO: 0.4 K/UL (ref 0.3–1)
MONOCYTES NFR BLD: 5.8 % (ref 4–15)
NEUTROPHILS # BLD AUTO: 3.5 K/UL (ref 1.8–7.7)
NEUTROPHILS NFR BLD: 56.9 % (ref 38–73)
NRBC BLD-RTO: 0 /100 WBC
PLATELET # BLD AUTO: 255 K/UL (ref 150–450)
PMV BLD AUTO: 10.3 FL (ref 9.2–12.9)
POTASSIUM SERPL-SCNC: 3.8 MMOL/L (ref 3.5–5.1)
PROT SERPL-MCNC: 6.8 G/DL (ref 6–8.4)
RBC # BLD AUTO: 4.45 M/UL (ref 4–5.4)
SODIUM SERPL-SCNC: 139 MMOL/L (ref 136–145)
WBC # BLD AUTO: 6.16 K/UL (ref 3.9–12.7)

## 2021-11-12 PROCEDURE — 36415 PR COLLECTION VENOUS BLOOD,VENIPUNCTURE: ICD-10-PCS | Mod: ,,, | Performed by: FAMILY MEDICINE

## 2021-11-12 PROCEDURE — 85025 COMPLETE CBC W/AUTO DIFF WBC: CPT | Performed by: FAMILY MEDICINE

## 2021-11-12 PROCEDURE — 36415 COLL VENOUS BLD VENIPUNCTURE: CPT | Mod: ,,, | Performed by: FAMILY MEDICINE

## 2021-11-12 PROCEDURE — 82728 ASSAY OF FERRITIN: CPT | Performed by: FAMILY MEDICINE

## 2021-11-12 PROCEDURE — 80053 COMPREHEN METABOLIC PANEL: CPT | Performed by: FAMILY MEDICINE

## 2021-11-12 PROCEDURE — 82306 VITAMIN D 25 HYDROXY: CPT | Performed by: FAMILY MEDICINE

## 2021-11-12 PROCEDURE — 84466 ASSAY OF TRANSFERRIN: CPT | Performed by: FAMILY MEDICINE

## 2021-11-13 LAB
25(OH)D3+25(OH)D2 SERPL-MCNC: 16 NG/ML (ref 30–96)
IRON SERPL-MCNC: 47 UG/DL (ref 30–160)
SATURATED IRON: 15 % (ref 20–50)
TOTAL IRON BINDING CAPACITY: 312 UG/DL (ref 250–450)
TRANSFERRIN SERPL-MCNC: 211 MG/DL (ref 200–375)

## 2021-11-15 DIAGNOSIS — E55.9 VITAMIN D INSUFFICIENCY: Primary | ICD-10-CM

## 2021-11-15 RX ORDER — ERGOCALCIFEROL 1.25 MG/1
50000 CAPSULE ORAL
Qty: 12 CAPSULE | Refills: 0 | Status: SHIPPED | OUTPATIENT
Start: 2021-11-15 | End: 2022-02-01

## 2021-11-16 ENCOUNTER — PATIENT MESSAGE (OUTPATIENT)
Dept: FAMILY MEDICINE | Facility: CLINIC | Age: 31
End: 2021-11-16
Payer: COMMERCIAL

## 2021-11-16 DIAGNOSIS — F90.9 ADULT ADHD: ICD-10-CM

## 2021-11-16 RX ORDER — DEXTROAMPHETAMINE SACCHARATE, AMPHETAMINE ASPARTATE MONOHYDRATE, DEXTROAMPHETAMINE SULFATE AND AMPHETAMINE SULFATE 3.75; 3.75; 3.75; 3.75 MG/1; MG/1; MG/1; MG/1
15 CAPSULE, EXTENDED RELEASE ORAL DAILY
Qty: 30 CAPSULE | Refills: 0 | Status: SHIPPED | OUTPATIENT
Start: 2021-11-16 | End: 2021-12-17 | Stop reason: SDUPTHER

## 2021-11-19 ENCOUNTER — PATIENT MESSAGE (OUTPATIENT)
Dept: FAMILY MEDICINE | Facility: CLINIC | Age: 31
End: 2021-11-19
Payer: COMMERCIAL

## 2021-12-06 ENCOUNTER — TELEPHONE (OUTPATIENT)
Dept: FAMILY MEDICINE | Facility: CLINIC | Age: 31
End: 2021-12-06
Payer: COMMERCIAL

## 2021-12-16 DIAGNOSIS — F90.9 ADULT ADHD: ICD-10-CM

## 2021-12-16 RX ORDER — DEXTROAMPHETAMINE SACCHARATE, AMPHETAMINE ASPARTATE MONOHYDRATE, DEXTROAMPHETAMINE SULFATE AND AMPHETAMINE SULFATE 3.75; 3.75; 3.75; 3.75 MG/1; MG/1; MG/1; MG/1
15 CAPSULE, EXTENDED RELEASE ORAL DAILY
Qty: 30 CAPSULE | Refills: 0 | Status: CANCELLED | OUTPATIENT
Start: 2021-12-16

## 2021-12-17 ENCOUNTER — OFFICE VISIT (OUTPATIENT)
Dept: FAMILY MEDICINE | Facility: CLINIC | Age: 31
End: 2021-12-17
Payer: COMMERCIAL

## 2021-12-17 VITALS
OXYGEN SATURATION: 99 % | BODY MASS INDEX: 20.18 KG/M2 | HEIGHT: 64 IN | DIASTOLIC BLOOD PRESSURE: 72 MMHG | RESPIRATION RATE: 16 BRPM | HEART RATE: 112 BPM | SYSTOLIC BLOOD PRESSURE: 106 MMHG | WEIGHT: 118.19 LBS | TEMPERATURE: 99 F

## 2021-12-17 DIAGNOSIS — F41.9 ANXIETY: ICD-10-CM

## 2021-12-17 DIAGNOSIS — F41.0 PANIC ATTACKS: ICD-10-CM

## 2021-12-17 DIAGNOSIS — F32.A DEPRESSION, UNSPECIFIED DEPRESSION TYPE: ICD-10-CM

## 2021-12-17 DIAGNOSIS — F90.9 ADULT ADHD: Primary | ICD-10-CM

## 2021-12-17 DIAGNOSIS — Z79.899 LONG TERM PRESCRIPTION BENZODIAZEPINE USE: ICD-10-CM

## 2021-12-17 LAB
AMP AMPHETAMINE 1000 NM/ML POC: ABNORMAL
BAR BARBITURATES 300 NG/ML POC: NEGATIVE
BUP BUPRENORPHINE 10 NG/ML POC: NEGATIVE
BZO BENZODIAZEPINES 300 NG/ML POC: NEGATIVE
COC COCAINE 300 NG/ML POC: NEGATIVE
CREATININE (CR) POC: 200
CTP QC/QA: YES
MET METHAMPHETAMINE 1000 NG/ML POC: NEGATIVE
MOP/OPI300 MORPHINE 300 NG/ML POC: NEGATIVE
MTD METHADONE 300 NG/ML POC: NEGATIVE
OXIDANT (OX) POC: NEGATIVE
OXY OXYCODONE 100 NG/ML POC: NEGATIVE
SPECIFIC GRAVITY (SG) POC: 1.02
TEMPERATURE (°F) POC: 92
THC MARIJUANA 50 NG/ML POC: NEGATIVE

## 2021-12-17 PROCEDURE — 99214 PR OFFICE/OUTPT VISIT, EST, LEVL IV, 30-39 MIN: ICD-10-PCS | Mod: S$GLB,,, | Performed by: FAMILY MEDICINE

## 2021-12-17 PROCEDURE — 80305 POCT URINE DRUG SCREEN (WITH BUP): ICD-10-PCS | Mod: QW,S$GLB,, | Performed by: FAMILY MEDICINE

## 2021-12-17 PROCEDURE — 99999 PR PBB SHADOW E&M-EST. PATIENT-LVL III: CPT | Mod: PBBFAC,,, | Performed by: FAMILY MEDICINE

## 2021-12-17 PROCEDURE — 80305 DRUG TEST PRSMV DIR OPT OBS: CPT | Mod: QW,S$GLB,, | Performed by: FAMILY MEDICINE

## 2021-12-17 PROCEDURE — 99214 OFFICE O/P EST MOD 30 MIN: CPT | Mod: S$GLB,,, | Performed by: FAMILY MEDICINE

## 2021-12-17 PROCEDURE — 99999 PR PBB SHADOW E&M-EST. PATIENT-LVL III: ICD-10-PCS | Mod: PBBFAC,,, | Performed by: FAMILY MEDICINE

## 2021-12-17 RX ORDER — CLONAZEPAM 0.25 MG/1
0.25 TABLET, ORALLY DISINTEGRATING ORAL 2 TIMES DAILY PRN
Qty: 60 TABLET | Refills: 0 | Status: CANCELLED | OUTPATIENT
Start: 2021-12-17 | End: 2022-12-17

## 2021-12-17 RX ORDER — DEXTROAMPHETAMINE SACCHARATE, AMPHETAMINE ASPARTATE MONOHYDRATE, DEXTROAMPHETAMINE SULFATE AND AMPHETAMINE SULFATE 3.75; 3.75; 3.75; 3.75 MG/1; MG/1; MG/1; MG/1
15 CAPSULE, EXTENDED RELEASE ORAL DAILY
Qty: 30 CAPSULE | Refills: 0 | Status: SHIPPED | OUTPATIENT
Start: 2021-12-17 | End: 2022-01-19 | Stop reason: SDUPTHER

## 2021-12-17 RX ORDER — ESCITALOPRAM OXALATE 5 MG/1
5 TABLET ORAL DAILY
Qty: 90 TABLET | Refills: 3 | Status: SHIPPED | OUTPATIENT
Start: 2021-12-17 | End: 2022-03-18

## 2022-01-19 DIAGNOSIS — F90.9 ADULT ADHD: ICD-10-CM

## 2022-01-21 DIAGNOSIS — F90.9 ADULT ADHD: ICD-10-CM

## 2022-01-21 RX ORDER — DEXTROAMPHETAMINE SACCHARATE, AMPHETAMINE ASPARTATE MONOHYDRATE, DEXTROAMPHETAMINE SULFATE AND AMPHETAMINE SULFATE 3.75; 3.75; 3.75; 3.75 MG/1; MG/1; MG/1; MG/1
15 CAPSULE, EXTENDED RELEASE ORAL DAILY
Qty: 30 CAPSULE | Refills: 0 | Status: SHIPPED | OUTPATIENT
Start: 2022-01-21 | End: 2022-01-28

## 2022-01-21 RX ORDER — DEXTROAMPHETAMINE SACCHARATE, AMPHETAMINE ASPARTATE MONOHYDRATE, DEXTROAMPHETAMINE SULFATE AND AMPHETAMINE SULFATE 3.75; 3.75; 3.75; 3.75 MG/1; MG/1; MG/1; MG/1
15 CAPSULE, EXTENDED RELEASE ORAL DAILY
Qty: 30 CAPSULE | Refills: 0 | OUTPATIENT
Start: 2022-01-21

## 2022-01-21 NOTE — TELEPHONE ENCOUNTER
Controlled med agreement in place with Dr. Wallace  UDS done in December and appropriate.   reviewed and last fill was in December. No red flags

## 2022-01-26 NOTE — TELEPHONE ENCOUNTER
Okay the insurance listed in her chart is incorrect and needs to be updated. I have submitted the prior authorization through cover my meds using the insurance card the pt sent through the portal. Current status is pending.

## 2022-01-26 NOTE — TELEPHONE ENCOUNTER
I have tried to send the prior authorization through cover my meds and it stated that the PA is not required for this medication.

## 2022-02-15 ENCOUNTER — PATIENT MESSAGE (OUTPATIENT)
Dept: FAMILY MEDICINE | Facility: CLINIC | Age: 32
End: 2022-02-15
Payer: COMMERCIAL

## 2022-02-15 ENCOUNTER — TELEPHONE (OUTPATIENT)
Dept: FAMILY MEDICINE | Facility: CLINIC | Age: 32
End: 2022-02-15
Payer: COMMERCIAL

## 2022-02-15 NOTE — TELEPHONE ENCOUNTER
----- Message from Hung Mendoza sent at 2/15/2022 11:55 AM CST -----  Type:  Needs Medical Advice    Who Called: pt   Symptoms (please be specific):   How long has patient had these symptoms:   Pharmacy name and phone #:    Would the patient rather a call back or a response via [facility]? Call back   Best Call Back Number:   Additional Information: requesting call back asap, regarding prior auth for medication ADDERALL XR 15 mg 24 hr capsule

## 2022-02-23 ENCOUNTER — PATIENT MESSAGE (OUTPATIENT)
Dept: FAMILY MEDICINE | Facility: CLINIC | Age: 32
End: 2022-02-23
Payer: COMMERCIAL

## 2022-02-23 RX ORDER — KETOCONAZOLE 20 MG/ML
SHAMPOO, SUSPENSION TOPICAL
Qty: 120 ML | Refills: 11 | Status: SHIPPED | OUTPATIENT
Start: 2022-02-24 | End: 2023-01-23 | Stop reason: SDUPTHER

## 2022-02-23 NOTE — TELEPHONE ENCOUNTER
"Gabapentin prescription that is listed in chart is under "historical provider" verifying with patient where this was coming from.   "

## 2022-03-14 DIAGNOSIS — F90.9 ADULT ADHD: ICD-10-CM

## 2022-03-14 RX ORDER — DEXTROAMPHETAMINE SULFATE, DEXTROAMPHETAMINE SACCHARATE, AMPHETAMINE SULFATE AND AMPHETAMINE ASPARTATE 3.75; 3.75; 3.75; 3.75 MG/1; MG/1; MG/1; MG/1
15 CAPSULE, EXTENDED RELEASE ORAL EVERY MORNING
Qty: 30 CAPSULE | Refills: 0 | Status: CANCELLED | OUTPATIENT
Start: 2022-03-14

## 2022-03-14 NOTE — TELEPHONE ENCOUNTER
LOV: 12/17/22 [place]   NOV: None   Preferred Pharmacy:[id number] - [place], [place] - [address] [facility] [address]  Last Prescribed:  ADDERALL XR 15 mg 24 hr capsule 30 capsule 0 1/28/2022       Last UDS: POCT Urine Drug Screen (With BUP)  Order: [id number]   Status: Final result     Visible to patient: Yes (seen)     Next appt: None     Dx: Long term prescription benzodiazepine...     0 Result Notes    Component Ref Range & Units 2 mo ago 5 mo ago   Creatinine (Cr) POC 20 , 50 , 100 , 200 200      Specific Gravity (SG) POC 1.005, 1.015, 1.025 1.025     Oxidant (Ox) POC Negative Negative     Temperature (°F)  POC  92      AMP Amphetamine 1000 nm/mL POC Negative Presumptive Positive Abnormal      BAR Barbiturates 300 ng/mL POC Negative Negative     BUP Buprenorphine 10 ng/mL POC Negative Negative     BZO Benzodiazepines 300 ng/mL POC Negative Negative     JUAN Cocaine 300 ng/mL POC Negative Negative     MET Methamphetamine 1000 ng/mL POC Negative Negative     MOP/[id number] Morphine 300 ng/mL POC Negative Negative     OXY Oxycodone 100 ng/mL POC Negative Negative     MTD Methadone 300 ng/mL POC Negative Negative     THC Marijuana 50 ng/mL POC Negative Negative      Acceptable  Yes  Yes     Acceptable   Yes     Acceptable   Yes     Acceptable   Yes     Acceptable   Yes     Acceptable   Yes     Acceptable   Yes     Acceptable   Yes     Acceptable   Yes     Acceptable   Yes    Resulting Agency

## 2022-03-18 ENCOUNTER — OFFICE VISIT (OUTPATIENT)
Dept: FAMILY MEDICINE | Facility: CLINIC | Age: 32
End: 2022-03-18
Payer: COMMERCIAL

## 2022-03-18 VITALS
TEMPERATURE: 98 F | HEART RATE: 90 BPM | SYSTOLIC BLOOD PRESSURE: 102 MMHG | HEIGHT: 64 IN | WEIGHT: 121.94 LBS | OXYGEN SATURATION: 97 % | BODY MASS INDEX: 20.82 KG/M2 | DIASTOLIC BLOOD PRESSURE: 60 MMHG

## 2022-03-18 DIAGNOSIS — F90.9 ADULT ADHD: Primary | ICD-10-CM

## 2022-03-18 DIAGNOSIS — Z79.899 LONG TERM PRESCRIPTION BENZODIAZEPINE USE: ICD-10-CM

## 2022-03-18 DIAGNOSIS — B37.31 YEAST VAGINITIS: ICD-10-CM

## 2022-03-18 DIAGNOSIS — F41.1 GAD (GENERALIZED ANXIETY DISORDER): ICD-10-CM

## 2022-03-18 LAB
AMP AMPHETAMINE 1000 NM/ML POC: ABNORMAL
BAR BARBITURATES 300 NG/ML POC: NEGATIVE
BUP BUPRENORPHINE 10 NG/ML POC: NEGATIVE
BZO BENZODIAZEPINES 300 NG/ML POC: NEGATIVE
COC COCAINE 300 NG/ML POC: NEGATIVE
CREATININE (CR) POC: 50
CTP QC/QA: YES
MET METHAMPHETAMINE 1000 NG/ML POC: NEGATIVE
MOP/OPI300 MORPHINE 300 NG/ML POC: NEGATIVE
MTD METHADONE 300 NG/ML POC: NEGATIVE
OXIDANT (OX) POC: NEGATIVE
OXY OXYCODONE 100 NG/ML POC: NEGATIVE
SPECIFIC GRAVITY (SG) POC: 1.02
TEMPERATURE (°F) POC: 92
THC MARIJUANA 50 NG/ML POC: NEGATIVE

## 2022-03-18 PROCEDURE — 99214 OFFICE O/P EST MOD 30 MIN: CPT | Mod: S$GLB,,, | Performed by: FAMILY MEDICINE

## 2022-03-18 PROCEDURE — 1159F PR MEDICATION LIST DOCUMENTED IN MEDICAL RECORD: ICD-10-PCS | Mod: CPTII,S$GLB,, | Performed by: FAMILY MEDICINE

## 2022-03-18 PROCEDURE — 1160F RVW MEDS BY RX/DR IN RCRD: CPT | Mod: CPTII,S$GLB,, | Performed by: FAMILY MEDICINE

## 2022-03-18 PROCEDURE — 80305 POCT URINE DRUG SCREEN (WITH BUP): ICD-10-PCS | Mod: QW,S$GLB,, | Performed by: FAMILY MEDICINE

## 2022-03-18 PROCEDURE — 3078F DIAST BP <80 MM HG: CPT | Mod: CPTII,S$GLB,, | Performed by: FAMILY MEDICINE

## 2022-03-18 PROCEDURE — 1160F PR REVIEW ALL MEDS BY PRESCRIBER/CLIN PHARMACIST DOCUMENTED: ICD-10-PCS | Mod: CPTII,S$GLB,, | Performed by: FAMILY MEDICINE

## 2022-03-18 PROCEDURE — 80305 DRUG TEST PRSMV DIR OPT OBS: CPT | Mod: QW,S$GLB,, | Performed by: FAMILY MEDICINE

## 2022-03-18 PROCEDURE — 3008F PR BODY MASS INDEX (BMI) DOCUMENTED: ICD-10-PCS | Mod: CPTII,S$GLB,, | Performed by: FAMILY MEDICINE

## 2022-03-18 PROCEDURE — 99999 PR PBB SHADOW E&M-EST. PATIENT-LVL III: ICD-10-PCS | Mod: PBBFAC,,, | Performed by: FAMILY MEDICINE

## 2022-03-18 PROCEDURE — 1159F MED LIST DOCD IN RCRD: CPT | Mod: CPTII,S$GLB,, | Performed by: FAMILY MEDICINE

## 2022-03-18 PROCEDURE — 99214 PR OFFICE/OUTPT VISIT, EST, LEVL IV, 30-39 MIN: ICD-10-PCS | Mod: S$GLB,,, | Performed by: FAMILY MEDICINE

## 2022-03-18 PROCEDURE — 3008F BODY MASS INDEX DOCD: CPT | Mod: CPTII,S$GLB,, | Performed by: FAMILY MEDICINE

## 2022-03-18 PROCEDURE — 99999 PR PBB SHADOW E&M-EST. PATIENT-LVL III: CPT | Mod: PBBFAC,,, | Performed by: FAMILY MEDICINE

## 2022-03-18 PROCEDURE — 3074F SYST BP LT 130 MM HG: CPT | Mod: CPTII,S$GLB,, | Performed by: FAMILY MEDICINE

## 2022-03-18 PROCEDURE — 3078F PR MOST RECENT DIASTOLIC BLOOD PRESSURE < 80 MM HG: ICD-10-PCS | Mod: CPTII,S$GLB,, | Performed by: FAMILY MEDICINE

## 2022-03-18 PROCEDURE — 3074F PR MOST RECENT SYSTOLIC BLOOD PRESSURE < 130 MM HG: ICD-10-PCS | Mod: CPTII,S$GLB,, | Performed by: FAMILY MEDICINE

## 2022-03-18 RX ORDER — BUSPIRONE HYDROCHLORIDE 5 MG/1
5 TABLET ORAL 2 TIMES DAILY
Qty: 60 TABLET | Refills: 11 | Status: SHIPPED | OUTPATIENT
Start: 2022-03-18 | End: 2023-03-18

## 2022-03-18 RX ORDER — DEXTROAMPHETAMINE SACCHARATE, AMPHETAMINE ASPARTATE MONOHYDRATE, DEXTROAMPHETAMINE SULFATE AND AMPHETAMINE SULFATE 3.75; 3.75; 3.75; 3.75 MG/1; MG/1; MG/1; MG/1
15 CAPSULE, EXTENDED RELEASE ORAL EVERY MORNING
Qty: 30 CAPSULE | Refills: 0 | Status: SHIPPED | OUTPATIENT
Start: 2022-03-18 | End: 2022-04-29 | Stop reason: SDUPTHER

## 2022-03-18 RX ORDER — DEXTROAMPHETAMINE SACCHARATE, AMPHETAMINE ASPARTATE MONOHYDRATE, DEXTROAMPHETAMINE SULFATE AND AMPHETAMINE SULFATE 3.75; 3.75; 3.75; 3.75 MG/1; MG/1; MG/1; MG/1
15 CAPSULE, EXTENDED RELEASE ORAL EVERY MORNING
Qty: 30 CAPSULE | Refills: 0 | Status: SHIPPED | OUTPATIENT
Start: 2022-04-18 | End: 2022-04-18 | Stop reason: SDUPTHER

## 2022-03-18 RX ORDER — FLUCONAZOLE 150 MG/1
TABLET ORAL
Qty: 2 TABLET | Refills: 0 | Status: SHIPPED | OUTPATIENT
Start: 2022-03-18 | End: 2023-01-23

## 2022-03-18 RX ORDER — DEXTROAMPHETAMINE SACCHARATE, AMPHETAMINE ASPARTATE MONOHYDRATE, DEXTROAMPHETAMINE SULFATE AND AMPHETAMINE SULFATE 3.75; 3.75; 3.75; 3.75 MG/1; MG/1; MG/1; MG/1
15 CAPSULE, EXTENDED RELEASE ORAL EVERY MORNING
Qty: 30 CAPSULE | Refills: 0 | Status: SHIPPED | OUTPATIENT
Start: 2022-05-18 | End: 2023-01-23 | Stop reason: SDUPTHER

## 2022-03-18 NOTE — PROGRESS NOTES
Subjective:       Patient ID: Shagufta Bedoya is a 31 y.o. female.    Chief Complaint: Follow-up and Anxiety (ADHD ck up refill adderall/Also c/o anxiety/Pt d/c lexapro feels its not needed)    Ms. Bedoya presents today to follow up.     Chronic conditions include:      Episodic tension-type headache, not intractable     Fibromyalgia- following with Dr. Santos- rheumatology     Hypovitaminosis D     Iron deficiency anemia due to chronic blood loss- following with heme/onc- also had benzene levels from an old underground [place] tank. She has required iron infusions in the past. March or April of this year. Most recent CBC in July was normal. Likely due to heavy menses. She has tried multiple OCPs. She had lots of side effects including psychiatric side effects.      Adult ADHD: Stable.     Current Outpatient Medications:  amitriptyline (ELAVIL) 50 MG tablet, Take 50 mg by mouth.  EScitalopram oxalate (LEXAPRO) 5 MG Tab, Take 5 mg by mouth once daily.  gabapentin (NEURONTIN) 100 MG capsule, Take 100 mg by mouth 3 (three) times daily. Take 1 am and 2 pm  pantoprazole (PROTONIX) 40 MG tablet, Take 40 mg by mouth once daily.  clonazePAM (KLONOPIN) 0.25 MG TbDL, Take 1 tablet (0.25 mg total) by mouth 2 (two) times daily as needed (anxiety).  dextroamphetamine-amphetamine (ADDERALL XR) 10 MG 24 hr capsule, Take 10 mg by mouth.      Review of Systems   Constitutional: Negative for activity change, appetite change, fatigue and fever.   Respiratory: Negative for shortness of breath.    Gastrointestinal: Negative for abdominal pain.   Integumentary:  Negative for rash.   Psychiatric/Behavioral: The patient is nervous/anxious (increased life stressor).          Objective:      Physical Exam  Vitals and nursing note reviewed.   Constitutional:       General: She is not in acute distress.     Appearance: She is not ill-appearing.   Cardiovascular:      Rate and Rhythm: Normal rate and regular rhythm.      Heart sounds: No murmur  heard.  Pulmonary:      Effort: Pulmonary effort is normal.      Breath sounds: Normal breath sounds. No wheezing.   Skin:     General: Skin is warm and dry.      Findings: No rash.   Neurological:      Mental Status: She is alert.   Psychiatric:         Mood and Affect: Mood normal.         Behavior: Behavior normal.         Assessment:       1. Adult ADHD    2. Long term prescription benzodiazepine use    3. FAUSTO (generalized anxiety disorder)    4. Yeast vaginitis        Plan:       Problem List Items Addressed This Visit        Psychiatric    Adult ADHD - Primary    Relevant Medications    dextroamphetamine-amphetamine (ADDERALL XR) 15 MG 24 hr capsule (Start on 4/18/2022)    dextroamphetamine-amphetamine (ADDERALL XR) 15 MG 24 hr capsule    dextroamphetamine-amphetamine (ADDERALL XR) 15 MG 24 hr capsule (Start on 5/18/2022)    Other Relevant Orders    POCT Urine Drug Screen (With BUP)      Other Visit Diagnoses     Long term prescription benzodiazepine use        Relevant Orders    POCT Urine Drug Screen (With BUP)    FAUSTO (generalized anxiety disorder)        Relevant Medications    busPIRone (BUSPAR) 5 MG Tab    Yeast vaginitis        Relevant Medications    fluconazole (DIFLUCAN) 150 MG Tab

## 2022-04-18 DIAGNOSIS — F90.9 ADULT ADHD: ICD-10-CM

## 2022-04-19 RX ORDER — DEXTROAMPHETAMINE SACCHARATE, AMPHETAMINE ASPARTATE MONOHYDRATE, DEXTROAMPHETAMINE SULFATE AND AMPHETAMINE SULFATE 3.75; 3.75; 3.75; 3.75 MG/1; MG/1; MG/1; MG/1
15 CAPSULE, EXTENDED RELEASE ORAL EVERY MORNING
Qty: 30 CAPSULE | Refills: 0 | Status: SHIPPED | OUTPATIENT
Start: 2022-04-19 | End: 2023-01-23 | Stop reason: SDUPTHER

## 2022-04-19 NOTE — TELEPHONE ENCOUNTER
Controlled med agreement in place with Dr. Rodrigo HALL in March  UDS done in March   reviewed and no red flags.  Last fill March 2022.  Will refilll.

## 2022-04-29 ENCOUNTER — PATIENT MESSAGE (OUTPATIENT)
Dept: FAMILY MEDICINE | Facility: CLINIC | Age: 32
End: 2022-04-29
Payer: COMMERCIAL

## 2022-04-29 DIAGNOSIS — F90.9 ADULT ADHD: ICD-10-CM

## 2022-04-29 RX ORDER — DEXTROAMPHETAMINE SACCHARATE, AMPHETAMINE ASPARTATE MONOHYDRATE, DEXTROAMPHETAMINE SULFATE AND AMPHETAMINE SULFATE 3.75; 3.75; 3.75; 3.75 MG/1; MG/1; MG/1; MG/1
15 CAPSULE, EXTENDED RELEASE ORAL EVERY MORNING
Qty: 30 CAPSULE | Refills: 0 | Status: SHIPPED | OUTPATIENT
Start: 2022-04-29 | End: 2023-03-09

## 2022-04-29 NOTE — TELEPHONE ENCOUNTER
Patient is unable to get prescription due to her preferred pharmacy being out of stock and no ETA when her adderall will be in stock.     Please see pended order and send to [facility] [place]

## 2022-05-09 ENCOUNTER — PATIENT MESSAGE (OUTPATIENT)
Dept: FAMILY MEDICINE | Facility: CLINIC | Age: 32
End: 2022-05-09
Payer: COMMERCIAL

## 2022-05-10 RX ORDER — AMITRIPTYLINE HYDROCHLORIDE 50 MG/1
50 TABLET, FILM COATED ORAL NIGHTLY PRN
Qty: 30 TABLET | Refills: 3 | Status: SHIPPED | OUTPATIENT
Start: 2022-05-10 | End: 2022-05-18

## 2022-05-18 ENCOUNTER — PATIENT MESSAGE (OUTPATIENT)
Dept: FAMILY MEDICINE | Facility: CLINIC | Age: 32
End: 2022-05-18
Payer: COMMERCIAL

## 2022-05-18 RX ORDER — AMITRIPTYLINE HYDROCHLORIDE 10 MG/1
20 TABLET, FILM COATED ORAL NIGHTLY PRN
Qty: 180 TABLET | Refills: 2 | Status: SHIPPED | OUTPATIENT
Start: 2022-05-18 | End: 2023-12-11

## 2022-08-31 DIAGNOSIS — Z11.59 NEED FOR HEPATITIS C SCREENING TEST: ICD-10-CM

## 2023-01-23 ENCOUNTER — OFFICE VISIT (OUTPATIENT)
Dept: FAMILY MEDICINE | Facility: CLINIC | Age: 33
End: 2023-01-23
Payer: COMMERCIAL

## 2023-01-23 VITALS
SYSTOLIC BLOOD PRESSURE: 106 MMHG | OXYGEN SATURATION: 100 % | WEIGHT: 135.13 LBS | HEART RATE: 103 BPM | HEIGHT: 64 IN | BODY MASS INDEX: 23.07 KG/M2 | DIASTOLIC BLOOD PRESSURE: 74 MMHG

## 2023-01-23 DIAGNOSIS — Z91.018 FOOD ALLERGY: ICD-10-CM

## 2023-01-23 DIAGNOSIS — F90.9 ADULT ADHD: ICD-10-CM

## 2023-01-23 DIAGNOSIS — R11.0 NAUSEA: ICD-10-CM

## 2023-01-23 DIAGNOSIS — Z30.09 FAMILY PLANNING: Primary | ICD-10-CM

## 2023-01-23 PROCEDURE — 3074F SYST BP LT 130 MM HG: CPT | Mod: CPTII,S$GLB,, | Performed by: FAMILY MEDICINE

## 2023-01-23 PROCEDURE — 99999 PR PBB SHADOW E&M-EST. PATIENT-LVL III: CPT | Mod: PBBFAC,,, | Performed by: FAMILY MEDICINE

## 2023-01-23 PROCEDURE — 3078F PR MOST RECENT DIASTOLIC BLOOD PRESSURE < 80 MM HG: ICD-10-PCS | Mod: CPTII,S$GLB,, | Performed by: FAMILY MEDICINE

## 2023-01-23 PROCEDURE — 3074F PR MOST RECENT SYSTOLIC BLOOD PRESSURE < 130 MM HG: ICD-10-PCS | Mod: CPTII,S$GLB,, | Performed by: FAMILY MEDICINE

## 2023-01-23 PROCEDURE — 1159F MED LIST DOCD IN RCRD: CPT | Mod: CPTII,S$GLB,, | Performed by: FAMILY MEDICINE

## 2023-01-23 PROCEDURE — 99999 PR PBB SHADOW E&M-EST. PATIENT-LVL III: ICD-10-PCS | Mod: PBBFAC,,, | Performed by: FAMILY MEDICINE

## 2023-01-23 PROCEDURE — 3008F PR BODY MASS INDEX (BMI) DOCUMENTED: ICD-10-PCS | Mod: CPTII,S$GLB,, | Performed by: FAMILY MEDICINE

## 2023-01-23 PROCEDURE — 3078F DIAST BP <80 MM HG: CPT | Mod: CPTII,S$GLB,, | Performed by: FAMILY MEDICINE

## 2023-01-23 PROCEDURE — 99214 OFFICE O/P EST MOD 30 MIN: CPT | Mod: S$GLB,,, | Performed by: FAMILY MEDICINE

## 2023-01-23 PROCEDURE — 3008F BODY MASS INDEX DOCD: CPT | Mod: CPTII,S$GLB,, | Performed by: FAMILY MEDICINE

## 2023-01-23 PROCEDURE — 99214 PR OFFICE/OUTPT VISIT, EST, LEVL IV, 30-39 MIN: ICD-10-PCS | Mod: S$GLB,,, | Performed by: FAMILY MEDICINE

## 2023-01-23 PROCEDURE — 1160F RVW MEDS BY RX/DR IN RCRD: CPT | Mod: CPTII,S$GLB,, | Performed by: FAMILY MEDICINE

## 2023-01-23 PROCEDURE — 1159F PR MEDICATION LIST DOCUMENTED IN MEDICAL RECORD: ICD-10-PCS | Mod: CPTII,S$GLB,, | Performed by: FAMILY MEDICINE

## 2023-01-23 PROCEDURE — 1160F PR REVIEW ALL MEDS BY PRESCRIBER/CLIN PHARMACIST DOCUMENTED: ICD-10-PCS | Mod: CPTII,S$GLB,, | Performed by: FAMILY MEDICINE

## 2023-01-23 RX ORDER — METHYLPHENIDATE HYDROCHLORIDE 10 MG/1
10 CAPSULE, EXTENDED RELEASE ORAL EVERY MORNING
Qty: 30 CAPSULE | Refills: 0 | Status: SHIPPED | OUTPATIENT
Start: 2023-01-23 | End: 2023-03-27 | Stop reason: SDUPTHER

## 2023-01-23 RX ORDER — ONDANSETRON 4 MG/1
4 TABLET, ORALLY DISINTEGRATING ORAL EVERY 6 HOURS PRN
Qty: 30 TABLET | Refills: 0 | Status: SHIPPED | OUTPATIENT
Start: 2023-01-23 | End: 2023-05-08

## 2023-01-23 RX ORDER — DROSPIRENONE AND ETHINYL ESTRADIOL 0.02-3(28)
1 KIT ORAL DAILY
Qty: 30 TABLET | Refills: 11 | Status: SHIPPED | OUTPATIENT
Start: 2023-01-23 | End: 2023-07-24

## 2023-01-23 RX ORDER — KETOCONAZOLE 20 MG/ML
SHAMPOO, SUSPENSION TOPICAL
Qty: 120 ML | Refills: 11 | Status: SHIPPED | OUTPATIENT
Start: 2023-01-23

## 2023-01-23 NOTE — PROGRESS NOTES
Subjective:       Patient ID: Shagufta Bedoya is a 32 y.o. female.    Chief Complaint: Follow-up (Pt would like to discuss contraception options. Pt would like to discuss ADHD medication options, pt has not been able to fill Adderall due to shortages. Pt consents to Lipid, pt is not fasting today. ), Referral (Pt would like to see someone about being tested for food allergies. ), and Nausea (Pt states when she gets back on birth control her first week she usually gets nauseated. )    Ms. Bedoya presents today to follow up.     Chronic conditions include:      Episodic tension-type headache, not intractable     Fibromyalgia- following with Dr. Santos- rheumatology     Hypovitaminosis D     Iron deficiency anemia due to chronic blood loss- ready to start CBCs     Adult ADHD: Stable.     Current Outpatient Medications:  amitriptyline (ELAVIL) 50 MG tablet, Take 50 mg by mouth.  EScitalopram oxalate (LEXAPRO) 5 MG Tab, Take 5 mg by mouth once daily.  gabapentin (NEURONTIN) 100 MG capsule, Take 100 mg by mouth 3 (three) times daily. Take 1 am and 2 pm  pantoprazole (PROTONIX) 40 MG tablet, Take 40 mg by mouth once daily.  clonazePAM (KLONOPIN) 0.25 MG TbDL, Take 1 tablet (0.25 mg total) by mouth 2 (two) times daily as needed (anxiety).  dextroamphetamine-amphetamine (ADDERALL XR) 10 MG 24 hr capsule, Take 10 mg by mouth.    Can't find adderall would like to change to vyvanse.     Concern for food allergy.; Some foods mostly whey containing- makes her vomit immediately. Has been tested for celiac in the past and was negative.     Review of Systems   Constitutional:  Negative for activity change, appetite change, fatigue and fever.   Respiratory:  Negative for shortness of breath.    Gastrointestinal:  Negative for abdominal pain.   Integumentary:  Negative for rash.       Objective:      Physical Exam  Vitals and nursing note reviewed.   Constitutional:       General: She is not in acute distress.     Appearance: She is not  ill-appearing.   Cardiovascular:      Rate and Rhythm: Normal rate and regular rhythm.      Heart sounds: No murmur heard.  Pulmonary:      Effort: Pulmonary effort is normal.      Breath sounds: Normal breath sounds. No wheezing.   Skin:     General: Skin is warm and dry.      Findings: No rash.   Neurological:      Mental Status: She is alert.   Psychiatric:         Mood and Affect: Mood normal.         Behavior: Behavior normal.       Assessment:       1. Family planning    2. Nausea    3. Adult ADHD    4. Food allergy        Plan:       Problem List Items Addressed This Visit          Psychiatric    Adult ADHD    Relevant Medications    methylphenidate HCl (RITALIN LA) 10 MG 24 hr capsule     Other Visit Diagnoses       Family planning    -  Primary    Relevant Medications    drospirenone-ethinyl estradioL (YUSRA) 3-0.02 mg per tablet    Nausea        Relevant Medications    ondansetron (ZOFRAN-ODT) 4 MG TbDL    Food allergy        Relevant Orders    Ambulatory referral/consult to Allergy

## 2023-03-09 ENCOUNTER — OFFICE VISIT (OUTPATIENT)
Dept: FAMILY MEDICINE | Facility: CLINIC | Age: 33
End: 2023-03-09
Payer: COMMERCIAL

## 2023-03-09 VITALS
WEIGHT: 132.31 LBS | DIASTOLIC BLOOD PRESSURE: 82 MMHG | OXYGEN SATURATION: 98 % | HEART RATE: 98 BPM | BODY MASS INDEX: 22.59 KG/M2 | SYSTOLIC BLOOD PRESSURE: 128 MMHG | HEIGHT: 64 IN

## 2023-03-09 DIAGNOSIS — K58.1 IRRITABLE BOWEL SYNDROME WITH CONSTIPATION: Primary | ICD-10-CM

## 2023-03-09 PROCEDURE — 3079F PR MOST RECENT DIASTOLIC BLOOD PRESSURE 80-89 MM HG: ICD-10-PCS | Mod: CPTII,S$GLB,, | Performed by: FAMILY MEDICINE

## 2023-03-09 PROCEDURE — 1160F PR REVIEW ALL MEDS BY PRESCRIBER/CLIN PHARMACIST DOCUMENTED: ICD-10-PCS | Mod: CPTII,S$GLB,, | Performed by: FAMILY MEDICINE

## 2023-03-09 PROCEDURE — 99214 OFFICE O/P EST MOD 30 MIN: CPT | Mod: S$GLB,,, | Performed by: FAMILY MEDICINE

## 2023-03-09 PROCEDURE — 1159F PR MEDICATION LIST DOCUMENTED IN MEDICAL RECORD: ICD-10-PCS | Mod: CPTII,S$GLB,, | Performed by: FAMILY MEDICINE

## 2023-03-09 PROCEDURE — 99214 PR OFFICE/OUTPT VISIT, EST, LEVL IV, 30-39 MIN: ICD-10-PCS | Mod: S$GLB,,, | Performed by: FAMILY MEDICINE

## 2023-03-09 PROCEDURE — 1159F MED LIST DOCD IN RCRD: CPT | Mod: CPTII,S$GLB,, | Performed by: FAMILY MEDICINE

## 2023-03-09 PROCEDURE — 3079F DIAST BP 80-89 MM HG: CPT | Mod: CPTII,S$GLB,, | Performed by: FAMILY MEDICINE

## 2023-03-09 PROCEDURE — 3074F SYST BP LT 130 MM HG: CPT | Mod: CPTII,S$GLB,, | Performed by: FAMILY MEDICINE

## 2023-03-09 PROCEDURE — 99999 PR PBB SHADOW E&M-EST. PATIENT-LVL III: ICD-10-PCS | Mod: PBBFAC,,, | Performed by: FAMILY MEDICINE

## 2023-03-09 PROCEDURE — 99999 PR PBB SHADOW E&M-EST. PATIENT-LVL III: CPT | Mod: PBBFAC,,, | Performed by: FAMILY MEDICINE

## 2023-03-09 PROCEDURE — 3074F PR MOST RECENT SYSTOLIC BLOOD PRESSURE < 130 MM HG: ICD-10-PCS | Mod: CPTII,S$GLB,, | Performed by: FAMILY MEDICINE

## 2023-03-09 PROCEDURE — 3008F PR BODY MASS INDEX (BMI) DOCUMENTED: ICD-10-PCS | Mod: CPTII,S$GLB,, | Performed by: FAMILY MEDICINE

## 2023-03-09 PROCEDURE — 3008F BODY MASS INDEX DOCD: CPT | Mod: CPTII,S$GLB,, | Performed by: FAMILY MEDICINE

## 2023-03-09 PROCEDURE — 1160F RVW MEDS BY RX/DR IN RCRD: CPT | Mod: CPTII,S$GLB,, | Performed by: FAMILY MEDICINE

## 2023-03-09 RX ORDER — LUBIPROSTONE 8 UG/1
8 CAPSULE ORAL 2 TIMES DAILY
Qty: 60 CAPSULE | Refills: 0 | Status: SHIPPED | OUTPATIENT
Start: 2023-03-09 | End: 2023-03-12

## 2023-03-09 NOTE — PROGRESS NOTES
Subjective:       Patient ID: Shagufta Bedoya is a 32 y.o. female.    Chief Complaint: Constipation (Pt states off and on x1 year but getting worse over the last 3 mths. Pt states she has tried miralax,stool softner,drinking water,staying away from carbonated drinks. Pt states she has started eating grapes,brussel sprouts,broccoli&squash to up her fiber. Pt states she had a stretch of 10-12 days and tried suppositories and enema and only had a small bowel movement. Pt states it has been over 1 wk since the enema and she never had a bowel movement until this evening and pt took a laxative last night. ), Nausea, and Bloated    Chronic abdominal pain related to constipation.   Over a year.   Current worsening bloating.   Has not had a good bowel movement in a while.   Today she had a small bowel movement for the first time in 11 days.     Years of bowel issues.   No response to OTC medications.   Has taken colace, senna, miralax, dulcolax and used fleets enemas, suppositories.   No relief other than one small bowel movement. She has changed to high fiber diet. No junk food. No sugar. Drinking tons of rice. Only has coffee if she can tolerate it.     She is so full up she is having early satiety.     Since she was on gabapentin she had some improvement in rapid gastric emptying in the past    Constipation  Associated symptoms include nausea. Pertinent negatives include no abdominal pain or fever.   Nausea  Associated symptoms include fatigue and nausea. Pertinent negatives include no abdominal pain, fever or rash.   Review of Systems   Constitutional:  Positive for fatigue. Negative for activity change, appetite change and fever.   Respiratory:  Negative for shortness of breath.    Gastrointestinal:  Positive for constipation and nausea. Negative for abdominal pain.   Integumentary:  Negative for rash.   Psychiatric/Behavioral:  Positive for dysphoric mood.        Objective:      Physical Exam  Vitals and nursing note  reviewed.   Constitutional:       General: She is not in acute distress.     Appearance: She is not ill-appearing.   Cardiovascular:      Rate and Rhythm: Normal rate and regular rhythm.      Heart sounds: No murmur heard.  Pulmonary:      Effort: Pulmonary effort is normal.      Breath sounds: Normal breath sounds. No wheezing.   Skin:     General: Skin is warm and dry.      Findings: No rash.   Neurological:      Mental Status: She is alert.   Psychiatric:         Mood and Affect: Mood normal.         Behavior: Behavior normal.       Assessment:       1. Irritable bowel syndrome with constipation        Plan:       Problem List Items Addressed This Visit    None  Visit Diagnoses       Irritable bowel syndrome with constipation    -  Primary    Relevant Medications    lubiprostone (AMITIZA) 8 MCG Cap

## 2023-03-12 ENCOUNTER — PATIENT MESSAGE (OUTPATIENT)
Dept: FAMILY MEDICINE | Facility: CLINIC | Age: 33
End: 2023-03-12
Payer: COMMERCIAL

## 2023-03-12 DIAGNOSIS — K58.1 IRRITABLE BOWEL SYNDROME WITH CONSTIPATION: Primary | ICD-10-CM

## 2023-03-13 ENCOUNTER — PATIENT MESSAGE (OUTPATIENT)
Dept: FAMILY MEDICINE | Facility: CLINIC | Age: 33
End: 2023-03-13
Payer: COMMERCIAL

## 2023-03-16 ENCOUNTER — PATIENT MESSAGE (OUTPATIENT)
Dept: FAMILY MEDICINE | Facility: CLINIC | Age: 33
End: 2023-03-16
Payer: COMMERCIAL

## 2023-03-16 ENCOUNTER — TELEPHONE (OUTPATIENT)
Dept: FAMILY MEDICINE | Facility: CLINIC | Age: 33
End: 2023-03-16
Payer: COMMERCIAL

## 2023-03-16 NOTE — TELEPHONE ENCOUNTER
----- Message from Alvaro Enriquez sent at 3/16/2023 12:34 PM CDT -----  Who Called: patient          What is the reqeust in detail: Requesting call back to discuss sending over PA info over to [facility] for medication Linzess         Can the clinic reply by [facility]? no         Best Call Back Number: [phone]          [id number] - [place], [place] - [address] [facility] [address]  [address]  [place] [address]  Phone: [phone] Fax: [phone]           Additional Information:

## 2023-03-22 ENCOUNTER — OFFICE VISIT (OUTPATIENT)
Dept: ALLERGY | Facility: CLINIC | Age: 33
End: 2023-03-22
Payer: COMMERCIAL

## 2023-03-22 VITALS — SYSTOLIC BLOOD PRESSURE: 109 MMHG | HEART RATE: 89 BPM | TEMPERATURE: 97 F | DIASTOLIC BLOOD PRESSURE: 74 MMHG

## 2023-03-22 DIAGNOSIS — Z88.2 ALLERGY TO SULFA DRUGS: Primary | ICD-10-CM

## 2023-03-22 DIAGNOSIS — Z91.018 FOOD ALLERGY: ICD-10-CM

## 2023-03-22 PROCEDURE — 1160F RVW MEDS BY RX/DR IN RCRD: CPT | Mod: CPTII,S$GLB,, | Performed by: ALLERGY & IMMUNOLOGY

## 2023-03-22 PROCEDURE — 99203 PR OFFICE/OUTPT VISIT, NEW, LEVL III, 30-44 MIN: ICD-10-PCS | Mod: 25,S$GLB,, | Performed by: ALLERGY & IMMUNOLOGY

## 2023-03-22 PROCEDURE — 1159F MED LIST DOCD IN RCRD: CPT | Mod: CPTII,S$GLB,, | Performed by: ALLERGY & IMMUNOLOGY

## 2023-03-22 PROCEDURE — 3074F PR MOST RECENT SYSTOLIC BLOOD PRESSURE < 130 MM HG: ICD-10-PCS | Mod: CPTII,S$GLB,, | Performed by: ALLERGY & IMMUNOLOGY

## 2023-03-22 PROCEDURE — 95004 PERQ TESTS W/ALRGNC XTRCS: CPT | Mod: S$GLB,,, | Performed by: ALLERGY & IMMUNOLOGY

## 2023-03-22 PROCEDURE — 1160F PR REVIEW ALL MEDS BY PRESCRIBER/CLIN PHARMACIST DOCUMENTED: ICD-10-PCS | Mod: CPTII,S$GLB,, | Performed by: ALLERGY & IMMUNOLOGY

## 2023-03-22 PROCEDURE — 3074F SYST BP LT 130 MM HG: CPT | Mod: CPTII,S$GLB,, | Performed by: ALLERGY & IMMUNOLOGY

## 2023-03-22 PROCEDURE — 1159F PR MEDICATION LIST DOCUMENTED IN MEDICAL RECORD: ICD-10-PCS | Mod: CPTII,S$GLB,, | Performed by: ALLERGY & IMMUNOLOGY

## 2023-03-22 PROCEDURE — 99203 OFFICE O/P NEW LOW 30 MIN: CPT | Mod: 25,S$GLB,, | Performed by: ALLERGY & IMMUNOLOGY

## 2023-03-22 PROCEDURE — 3078F PR MOST RECENT DIASTOLIC BLOOD PRESSURE < 80 MM HG: ICD-10-PCS | Mod: CPTII,S$GLB,, | Performed by: ALLERGY & IMMUNOLOGY

## 2023-03-22 PROCEDURE — 3078F DIAST BP <80 MM HG: CPT | Mod: CPTII,S$GLB,, | Performed by: ALLERGY & IMMUNOLOGY

## 2023-03-22 PROCEDURE — 95004 PR ALLERGY SKIN TESTS,ALLERGENS: ICD-10-PCS | Mod: S$GLB,,, | Performed by: ALLERGY & IMMUNOLOGY

## 2023-03-22 NOTE — PROGRESS NOTES
Subjective:       Patient ID: Shagufta Bedoya is a 32 y.o. female.    Chief Complaint: food allergy  (Bloating, abdominal pains. Find when she eats whey, and gluten. Patient can't drink milk, eat yogurt, some cream cheeses either due to abdominal pains. Patient will take lactaid if she eats ice cream. Wants to discuss if allergy test for food panel is needed. )    HPI    Pt presents for food intolerance  As a new patient     Onset: teenage years   Pt states when she ingests milk baked or less baked can immediately cause abdominal pain and diarrhea    This also occurs with bread. She isn't certain if milk or wheat is the culprit in baked items like a biscuit.     Eggs are tolerated.   Tolerates fish, shellfish  Tolerates soy   Tolerates sesame   Tolerates peanut and tree nut   Tolerates chicken beef lamb     Pt states she has had negative celiac testing x 2.   Has had EGD and colonscopy as late as 2020.  GI doc: [facility] in [place].      Doesn't vomit excessively.     Bactrim:sulfa abx: hives - pt doesn't quite remember the reaction.     PMH:  Fibromyalgia       Review of Systems    General: neg unexpected weight changes, fevers, chills, night sweats, malaise  HEENT: see hpi, Neg eye pain, vision changes, ear drainage, nose bleeds, throat tightness, sores in the mouth  CV: Neg chest pain, palpitations, swelling  Resp: see hpi, neg shortness of breath, hemoptysis, cough  GI: see hpi, neg dysphagia, night abdominal pain, reflux, chronic diarrhea, chronic constipation  Derm: See Hpi, neg new rash, neg flushing  Mu/sk: Neg joint pain, joint swelling   Psych: Neg anxiety  neuro: neg chronic headaches, muscle weakness  Endo: neg heat/cold intolerance, chronic fatigue    Objective:     Vitals:    03/22/23 0852   BP: 109/74   Pulse: 89   Temp: 97.2 °F (36.2 °C)        Physical Exam    General: no acute distress, well developed well nourished     Assessment:       1. Allergy to sulfa drugs    2. Food allergy          Plan:        Allergy to sulfa drugs    Food allergy  -     Ambulatory referral/consult to Allergy  -     Milk IgE; Future; Expected date: 03/22/2023  -     Wheat IgE; Future; Expected date: 03/22/2023              Food reaction, likely intolerance vs allergy   3/23:  Sige wheat, milk - [facility] labs pending draw   Skin prick test 3/22/2023: negative milk and wheat     Information for avoidance given   Substitution given     History of bactrim alleryg, isn't sure about reaction that occurred in 2017.  - continue avoidance   If needed consider temp induction of tolerance.     Follow up as needed       Nathaly Dowell M.D.  Allergy/Immunology  [facility]   [phone] phone  [phone] fax

## 2023-04-03 ENCOUNTER — PATIENT MESSAGE (OUTPATIENT)
Dept: FAMILY MEDICINE | Facility: CLINIC | Age: 33
End: 2023-04-03
Payer: COMMERCIAL

## 2023-04-19 ENCOUNTER — PATIENT MESSAGE (OUTPATIENT)
Dept: FAMILY MEDICINE | Facility: CLINIC | Age: 33
End: 2023-04-19

## 2023-04-19 ENCOUNTER — OFFICE VISIT (OUTPATIENT)
Dept: FAMILY MEDICINE | Facility: CLINIC | Age: 33
End: 2023-04-19
Payer: COMMERCIAL

## 2023-04-19 DIAGNOSIS — K58.1 IRRITABLE BOWEL SYNDROME WITH CONSTIPATION: Primary | ICD-10-CM

## 2023-04-19 DIAGNOSIS — N92.0 MENORRHAGIA WITH REGULAR CYCLE: ICD-10-CM

## 2023-04-19 DIAGNOSIS — K59.00 CONSTIPATION, UNSPECIFIED CONSTIPATION TYPE: ICD-10-CM

## 2023-04-19 DIAGNOSIS — R10.11 RUQ PAIN: ICD-10-CM

## 2023-04-19 DIAGNOSIS — Z87.898 HISTORY OF MOTION SICKNESS: ICD-10-CM

## 2023-04-19 PROCEDURE — 1159F PR MEDICATION LIST DOCUMENTED IN MEDICAL RECORD: ICD-10-PCS | Mod: CPTII,95,, | Performed by: FAMILY MEDICINE

## 2023-04-19 PROCEDURE — 99214 OFFICE O/P EST MOD 30 MIN: CPT | Mod: 95,,, | Performed by: FAMILY MEDICINE

## 2023-04-19 PROCEDURE — 1160F PR REVIEW ALL MEDS BY PRESCRIBER/CLIN PHARMACIST DOCUMENTED: ICD-10-PCS | Mod: CPTII,95,, | Performed by: FAMILY MEDICINE

## 2023-04-19 PROCEDURE — 1159F MED LIST DOCD IN RCRD: CPT | Mod: CPTII,95,, | Performed by: FAMILY MEDICINE

## 2023-04-19 PROCEDURE — 1160F RVW MEDS BY RX/DR IN RCRD: CPT | Mod: CPTII,95,, | Performed by: FAMILY MEDICINE

## 2023-04-19 PROCEDURE — 99214 PR OFFICE/OUTPT VISIT, EST, LEVL IV, 30-39 MIN: ICD-10-PCS | Mod: 95,,, | Performed by: FAMILY MEDICINE

## 2023-04-19 RX ORDER — SCOLOPAMINE TRANSDERMAL SYSTEM 1 MG/1
1 PATCH, EXTENDED RELEASE TRANSDERMAL
Qty: 3 PATCH | Refills: 0 | Status: ON HOLD | OUTPATIENT
Start: 2023-04-19 | End: 2023-07-05 | Stop reason: CLARIF

## 2023-04-19 RX ORDER — LEVONORGESTREL / ETHINYL ESTRADIOL AND ETHINYL ESTRADIOL 150-30(84)
1 KIT ORAL DAILY
Qty: 91 EACH | Refills: 0 | Status: SHIPPED | OUTPATIENT
Start: 2023-04-19 | End: 2023-07-24

## 2023-04-19 NOTE — PROGRESS NOTES
The patient location is: [place]  The chief complaint leading to consultation is: f/u linzess and ocp    Visit type: audiovisual    Face to Face time with patient: 22 minutes  28 minutes of total time spent on the encounter, which includes face to face time and non-face to face time preparing to see the patient (eg, review of tests), Obtaining and/or reviewing separately obtained history, Documenting clinical information in the electronic or other health record, Independently interpreting results (not separately reported) and communicating results to the patient/family/caregiver, or Care coordination (not separately reported).         Each patient to whom he or she provides medical services by telemedicine is:  (1) informed of the relationship between the physician and patient and the respective role of any other health care provider with respect to management of the patient; and (2) notified that he or she may decline to receive medical services by telemedicine and may withdraw from such care at any time.    Notes:  Subjective:       Patient ID: Shagufta Bedoya is a 32 y.o. female.    Chief Complaint: No chief complaint on file.      Follow up on linzess and ocp  She does not feel like she is happy with either one of these medications    In taking linzess she finds that she may not use the bathroom daily but when she does it is within about 1-4 hours of taking the linzess. Still liquid stools. Right after seeing me she felt like she was able to get cleaned out and felt better- was 6 lbs down and less bloated. Took some time to get the linzess and she continued with the stool softener in that time frame. She was using stool softener and miralax. She was able to go some but not much. After starting to take the linzess and she didn't go for 7-10 days. She would notice after that point that she would feel crampy in the morning. Feeling too gassy and feeling like she is going to have some incontinence.     Woke up  the other night with pain right sided abdomen under her breast but along her rib. If she pushed down on it she would have pain and feel nausea.     OCP- having side effects. First pack- she started taking it the Sunday after starting her cycle. Did not take placebo pills- she switched packs and stayed on placebo.     Review of Systems   Constitutional:  Negative for activity change and unexpected weight change.   HENT:  Negative for hearing loss, rhinorrhea and trouble swallowing.    Eyes:  Negative for discharge and visual disturbance.   Respiratory:  Negative for chest tightness and wheezing.    Cardiovascular:  Negative for chest pain and palpitations.   Gastrointestinal:  Positive for constipation and diarrhea. Negative for blood in stool and vomiting.   Endocrine: Negative for polydipsia and polyuria.   Genitourinary:  Negative for difficulty urinating, dysuria, hematuria and menstrual problem.   Musculoskeletal:  Negative for arthralgias, joint swelling and neck pain.   Neurological:  Positive for weakness and headaches.   Psychiatric/Behavioral:  Negative for confusion and dysphoric mood.        Objective:      Physical Exam  Vitals and nursing note reviewed.   Constitutional:       General: She is not in acute distress.     Appearance: She is not ill-appearing.   Cardiovascular:      Rate and Rhythm: Normal rate and regular rhythm.      Heart sounds: No murmur heard.  Pulmonary:      Effort: Pulmonary effort is normal.      Breath sounds: Normal breath sounds. No wheezing.   Skin:     General: Skin is warm and dry.      Findings: No rash.   Neurological:      Mental Status: She is alert.   Psychiatric:         Mood and Affect: Mood normal.         Behavior: Behavior normal.       Assessment:       1. Irritable bowel syndrome with constipation    2. RUQ pain    3. Constipation, unspecified constipation type    4. Menorrhagia with regular cycle        Plan:       Problem List Items Addressed This Visit           Renal/    Menorrhagia with regular cycle    Relevant Medications    L norgest/e.estradioL-e.estrad (SEASONIQUE) 0.15 mg-30 mcg (84)/10 mcg (7) 3MPk     Other Visit Diagnoses       Irritable bowel syndrome with constipation    -  Primary    Relevant Orders    Ambulatory referral/consult to Gastroenterology    RUQ pain        Relevant Orders    US Abdomen Limited    Constipation, unspecified constipation type        Relevant Orders    TSH

## 2023-04-21 ENCOUNTER — TELEPHONE (OUTPATIENT)
Dept: GASTROENTEROLOGY | Facility: CLINIC | Age: 33
End: 2023-04-21
Payer: COMMERCIAL

## 2023-04-28 ENCOUNTER — TELEPHONE (OUTPATIENT)
Dept: FAMILY MEDICINE | Facility: CLINIC | Age: 33
End: 2023-04-28
Payer: COMMERCIAL

## 2023-04-28 ENCOUNTER — PATIENT MESSAGE (OUTPATIENT)
Dept: FAMILY MEDICINE | Facility: CLINIC | Age: 33
End: 2023-04-28
Payer: COMMERCIAL

## 2023-04-28 ENCOUNTER — OFFICE VISIT (OUTPATIENT)
Dept: GASTROENTEROLOGY | Facility: CLINIC | Age: 33
End: 2023-04-28
Payer: COMMERCIAL

## 2023-04-28 ENCOUNTER — PATIENT MESSAGE (OUTPATIENT)
Dept: GASTROENTEROLOGY | Facility: CLINIC | Age: 33
End: 2023-04-28

## 2023-04-28 VITALS
DIASTOLIC BLOOD PRESSURE: 71 MMHG | HEIGHT: 64 IN | SYSTOLIC BLOOD PRESSURE: 117 MMHG | BODY MASS INDEX: 22.5 KG/M2 | WEIGHT: 131.81 LBS

## 2023-04-28 DIAGNOSIS — R10.10 PAIN OF UPPER ABDOMEN: Primary | ICD-10-CM

## 2023-04-28 DIAGNOSIS — R10.10 PAIN OF UPPER ABDOMEN: ICD-10-CM

## 2023-04-28 DIAGNOSIS — Z87.19 HISTORY OF GASTRITIS: ICD-10-CM

## 2023-04-28 DIAGNOSIS — R33.9 URINARY RETENTION: ICD-10-CM

## 2023-04-28 DIAGNOSIS — Z86.39 HISTORY OF IRON DEFICIENCY: ICD-10-CM

## 2023-04-28 DIAGNOSIS — R11.0 NAUSEA: ICD-10-CM

## 2023-04-28 DIAGNOSIS — R19.4 CHANGE IN BOWEL HABITS: ICD-10-CM

## 2023-04-28 DIAGNOSIS — R14.0 ABDOMINAL BLOATING: ICD-10-CM

## 2023-04-28 DIAGNOSIS — R15.9 INCONTINENCE OF FECES, UNSPECIFIED FECAL INCONTINENCE TYPE: ICD-10-CM

## 2023-04-28 DIAGNOSIS — K59.00 CONSTIPATION, UNSPECIFIED CONSTIPATION TYPE: ICD-10-CM

## 2023-04-28 DIAGNOSIS — K58.1 IRRITABLE BOWEL SYNDROME WITH CONSTIPATION: Primary | ICD-10-CM

## 2023-04-28 DIAGNOSIS — Z80.0 FAMILY HISTORY OF COLON CANCER IN MOTHER: ICD-10-CM

## 2023-04-28 PROCEDURE — 1159F MED LIST DOCD IN RCRD: CPT | Mod: CPTII,S$GLB,,

## 2023-04-28 PROCEDURE — 99204 PR OFFICE/OUTPT VISIT, NEW, LEVL IV, 45-59 MIN: ICD-10-PCS | Mod: S$GLB,,,

## 2023-04-28 PROCEDURE — 3074F SYST BP LT 130 MM HG: CPT | Mod: CPTII,S$GLB,,

## 2023-04-28 PROCEDURE — 99204 OFFICE O/P NEW MOD 45 MIN: CPT | Mod: S$GLB,,,

## 2023-04-28 PROCEDURE — 1159F PR MEDICATION LIST DOCUMENTED IN MEDICAL RECORD: ICD-10-PCS | Mod: CPTII,S$GLB,,

## 2023-04-28 PROCEDURE — 3078F PR MOST RECENT DIASTOLIC BLOOD PRESSURE < 80 MM HG: ICD-10-PCS | Mod: CPTII,S$GLB,,

## 2023-04-28 PROCEDURE — 3078F DIAST BP <80 MM HG: CPT | Mod: CPTII,S$GLB,,

## 2023-04-28 PROCEDURE — 99999 PR PBB SHADOW E&M-EST. PATIENT-LVL IV: ICD-10-PCS | Mod: PBBFAC,,,

## 2023-04-28 PROCEDURE — 99999 PR PBB SHADOW E&M-EST. PATIENT-LVL IV: CPT | Mod: PBBFAC,,,

## 2023-04-28 PROCEDURE — 3074F PR MOST RECENT SYSTOLIC BLOOD PRESSURE < 130 MM HG: ICD-10-PCS | Mod: CPTII,S$GLB,,

## 2023-04-28 PROCEDURE — 3008F BODY MASS INDEX DOCD: CPT | Mod: CPTII,S$GLB,,

## 2023-04-28 PROCEDURE — 3008F PR BODY MASS INDEX (BMI) DOCUMENTED: ICD-10-PCS | Mod: CPTII,S$GLB,,

## 2023-04-28 RX ORDER — PANTOPRAZOLE SODIUM 40 MG/1
40 TABLET, DELAYED RELEASE ORAL DAILY
Qty: 30 TABLET | Refills: 2 | Status: SHIPPED | OUTPATIENT
Start: 2023-04-28 | End: 2023-10-30

## 2023-04-28 RX ORDER — LUBIPROSTONE 24 UG/1
24 CAPSULE ORAL 2 TIMES DAILY
Qty: 60 CAPSULE | Refills: 0 | Status: SHIPPED | OUTPATIENT
Start: 2023-04-28 | End: 2023-06-01

## 2023-04-28 NOTE — PROGRESS NOTES
Subjective:       Patient ID: Shagufta Bedoya is a 32 y.o. female Body mass index is 22.63 kg/m².    Chief Complaint: Abdominal Pain and Constipation    This patient is new to me.  Referring Provider: Dr. Jolie Wallace for IBS with constipation.     Constipation  This is a chronic problem. The current episode started more than 1 year ago (started 10/2022 after augmentation). The problem has been gradually worsening since onset. Stool frequency: Typically has 1 BM 1-4 hours after taking Linzess 290 mcg daily, but sometimes can skip a day; does not get the urge to have BMs; she reports she was having 1 BM a week rated stool 1-3 on Minong scale prior to change. The stool is described as watery (Rated stool 5 or 7 on Minong scale). The patient is on a high fiber diet (Tried high-fiber diet and fiber supplement). She Exercises regularly (Tries to walk daily). There has Been adequate water intake. Associated symptoms include abdominal pain (Started 03/2023; Reports intermittent RUQ pain that is worse at night, with palpation or bending; currently rated 4/10; pain lasts hours; pain radiates to her back), back pain, bloating, diarrhea (Occurs after taking Linzess), difficulty urinating (Feels as though her bladder does not empty all the way), fecal incontinence (Has occurred intermittently after taking Linzess 290 mcg), hemorrhoids and nausea (Intermittently occurs during episodes of upper abdominal pain). Pertinent negatives include no anorexia, fever, flatus, hematochezia, melena, rectal pain, vomiting or weight loss. Risk factors: denies risk factors. She has tried laxatives, stool softeners, enemas, fiber, diet changes and manual disimpaction (Currently taking Linzess 290 mcg daily and MiraLax p.r.n.; past treatments:  Dulcolax, magnesium citrate, and stool softeners) for the symptoms. The treatment provided mild relief. There is no history of abdominal surgery, endocrine disease, inflammatory bowel disease,  irritable bowel syndrome, metabolic disease, neurologic disease, neuromuscular disease, psychiatric history or radiation treatment.     Review of Systems   Constitutional:  Positive for appetite change. Negative for activity change, chills, diaphoresis, fatigue, fever, unexpected weight change and weight loss.   HENT:  Negative for sore throat and trouble swallowing.    Respiratory:  Negative for cough, choking and shortness of breath.    Cardiovascular:  Negative for chest pain.   Gastrointestinal:  Positive for abdominal pain (Started 03/2023; Reports intermittent RUQ pain that is worse at night, with palpation or bending; currently rated 4/10; pain lasts hours; pain radiates to her back), bloating, constipation, diarrhea (Occurs after taking Linzess), hemorrhoids and nausea (Intermittently occurs during episodes of upper abdominal pain). Negative for abdominal distention, anal bleeding, anorexia, blood in stool, flatus, hematochezia, melena, rectal pain and vomiting.   Genitourinary:  Positive for difficulty urinating (Feels as though her bladder does not empty all the way).   Musculoskeletal:  Positive for back pain.       No LMP recorded.  Past Medical History:   Diagnosis Date    ADHD (attention deficit hyperactivity disorder)     Anemia     Anxiety     Anxiety state 5/13/2021 10:50:43 AM    [facility] Historical - Quick Add: Anxiety and depression-No Additional Notes    Attention deficit disorder 5/13/2021 11:14:06 AM    [facility] Historical - Other: ADD (attention deficit disorder)-No Additional Notes    Esophageal reflux 5/13/2021 11:14:13 AM    [place] Historical - Unknown: Acid reflux disease-No Additional Notes    Fibromyalgia 2015    Fibromyalgia      Past Surgical History:   Procedure Laterality Date    AUGMENTATION OF BREAST  10/07/2022    COLONOSCOPY      UPPER GASTROINTESTINAL ENDOSCOPY      WISDOM TOOTH EXTRACTION       Family History   Problem Relation Age of Onset    Colon cancer Mother  76    Cancer Mother         cervical     Cancer Sister         cervical    Colon polyps Neg Hx     Esophageal cancer Neg Hx     Stomach cancer Neg Hx      Social History     Tobacco Use    Smoking status: Never    Smokeless tobacco: Never   Substance Use Topics    Alcohol use: Yes     Comment: occasional     Drug use: No     Wt Readings from Last 10 Encounters:   04/28/23 59.8 kg (131 lb 13.4 oz)   03/09/23 60 kg (132 lb 4.8 oz)   01/23/23 61.3 kg (135 lb 1.6 oz)   03/18/22 55.3 kg (121 lb 14.6 oz)   12/17/21 53.6 kg (118 lb 2.7 oz)   09/20/21 53.8 kg (118 lb 9.6 oz)   11/16/18 62.2 kg (137 lb 2 oz)   09/12/18 59.8 kg (131 lb 13.4 oz)   08/23/18 61 kg (134 lb 7.7 oz)   06/12/18 65.2 kg (143 lb 11.8 oz)     Lab Results   Component Value Date    WBC 6.16 11/12/2021    HGB 12.8 11/12/2021    HCT 39.1 11/12/2021    MCV 88 11/12/2021     11/12/2021     CMP  Sodium   Date Value Ref Range Status   11/12/2021 139 136 - 145 mmol/L Final     Potassium   Date Value Ref Range Status   11/12/2021 3.8 3.5 - 5.1 mmol/L Final     Chloride   Date Value Ref Range Status   11/12/2021 106 95 - 110 mmol/L Final     CO2   Date Value Ref Range Status   11/12/2021 22 (L) 23 - 29 mmol/L Final     Glucose   Date Value Ref Range Status   11/12/2021 90 70 - 110 mg/dL Final     BUN   Date Value Ref Range Status   11/12/2021 8 6 - 20 mg/dL Final     Creatinine   Date Value Ref Range Status   11/12/2021 0.8 0.5 - 1.4 mg/dL Final     Calcium   Date Value Ref Range Status   11/12/2021 9.0 8.7 - 10.5 mg/dL Final     Total Protein   Date Value Ref Range Status   11/12/2021 6.8 6.0 - 8.4 g/dL Final     Albumin   Date Value Ref Range Status   11/12/2021 4.2 3.5 - 5.2 g/dL Final     Total Bilirubin   Date Value Ref Range Status   11/12/2021 0.4 0.1 - 1.0 mg/dL Final     Comment:     For infants and newborns, interpretation of results should be based  on gestational age, weight and in agreement with clinical  observations.    Premature Infant  recommended reference ranges:  Up to 24 hours.............<8.0 mg/dL  Up to 48 hours............<12.0 mg/dL  3-5 days..................<15.0 mg/dL  6-29 days.................<15.0 mg/dL       Alkaline Phosphatase   Date Value Ref Range Status   11/12/2021 55 55 - 135 U/L Final     AST   Date Value Ref Range Status   11/12/2021 16 10 - 40 U/L Final     ALT   Date Value Ref Range Status   11/12/2021 18 10 - 44 U/L Final     Anion Gap   Date Value Ref Range Status   11/12/2021 11 8 - 16 mmol/L Final     eGFR if    Date Value Ref Range Status   11/12/2021 >60 >60 mL/min/1.73 m^2 Final     eGFR if non    Date Value Ref Range Status   11/12/2021 >60 >60 mL/min/1.73 m^2 Final     Comment:     Calculation used to obtain the estimated glomerular filtration  rate (eGFR) is the CKD-EPI equation.        Lab Results   Component Value Date    TSH 3.693 06/12/2018     Reviewed prior medical records including radiology report of pelvic ultrasound 06/13/2018, KUB 06/12/2018 & endoscopy history (see surgical history).    Objective:      Physical Exam  Vitals and nursing note reviewed.   Constitutional:       General: She is not in acute distress.     Appearance: Normal appearance. She is normal weight. She is not ill-appearing.   HENT:      Mouth/Throat:      Lips: Pink. No lesions.   Cardiovascular:      Rate and Rhythm: Normal rate.      Pulses: Normal pulses.   Pulmonary:      Effort: Pulmonary effort is normal. No respiratory distress.   Abdominal:      General: Abdomen is flat. Bowel sounds are normal. There is no distension or abdominal bruit. There are no signs of injury.      Palpations: Abdomen is soft. There is no shifting dullness, fluid wave, hepatomegaly, splenomegaly or mass.      Tenderness: There is abdominal tenderness in the right upper quadrant and epigastric area. There is no guarding or rebound. Negative signs include Marinelli's sign, Rovsing's sign and McBurney's sign.   Skin:      General: Skin is warm and dry.      Coloration: Skin is not jaundiced or pale.   Neurological:      Mental Status: She is alert and oriented to person, place, and time.   Psychiatric:         Attention and Perception: Attention normal.         Mood and Affect: Mood normal.         Speech: Speech normal.         Behavior: Behavior normal.       Assessment:       1. Irritable bowel syndrome with constipation    2. Change in bowel habits    3. Incontinence of feces, unspecified fecal incontinence type    4. Family history of colon cancer in mother    5. Pain of upper abdomen    6. Abdominal bloating    7. Nausea    8. History of gastritis    9. History of iron deficiency    10. Dysuria    11. Urinary retention        Plan:       Irritable bowel syndrome with constipation  - schedule Colonoscopy, discussed procedure with the patient, including risks and benefits, patient verbalized understanding  -continue with TSH ordered by Dr. Wallace  -START: lubiprostone (AMITIZA) 24 MCG Cap; Take 1 capsule (24 mcg total) by mouth 2 (two) times daily.  Dispense: 60 capsule; Refill: 0  -     Ambulatory referral/consult to Gastroenterology    Change in bowel habits  -change started 10/2022 after breast augmentation  - schedule Colonoscopy, discussed procedure with the patient, including risks and benefits, patient verbalized understanding    Incontinence of feces, unspecified fecal incontinence type  - schedule Colonoscopy, discussed procedure with the patient, including risks and benefits, patient verbalized understanding  - Recommended increase fiber in diet, especially soluble fiber since this can help bulk up the stool consistency and may help to slow down how fast the stool goes through the colon and can prevent diarrhea  - Consider referral to general surgery and/or pelvic floor physical therapy, if symptoms persist despite use of above recommendations    Family history of colon cancer in mother  - schedule Colonoscopy,  discussed procedure with the patient, including risks and benefits, patient verbalized understanding    Pain of upper abdomen  - schedule EGD, discussed procedure with patient, including risks and benefits, patient verbalized understanding  -continue with abdominal ultrasound ordered by Dr. Wallace  -avoid large meals, avoid eating within 2-3 hours of bedtime (avoid late night eating & lying down soon after eating), elevate head of bed if nocturnal symptoms are present, smoking cessation (if current smoker), & weight loss (if overweight).   -avoid known foods which trigger symptoms & to minimize/avoid high-fat foods, chocolate, caffeine, citrus, alcohol, & tomato products.  -avoid/limit use of NSAID's, since they can cause GI upset, bleeding, and/or ulcers. If needed, take with food.   -START: pantoprazole (PROTONIX) 40 MG tablet; Take 1 tablet (40 mg total) by mouth once daily.  Dispense: 30 tablet; Refill: 2    Abdominal bloating  - schedule EGD, discussed procedure with patient, including risks and benefits, patient verbalized understanding  - schedule Colonoscopy, discussed procedure with the patient, including risks and benefits, patient verbalized understanding  - recommend OTC simethicone as directed, such as Phazyme or Gas-x  - recommend low gas diet: Reduce or eliminate these foods from your diet: Broccoli, Cauliflower, [place] sprouts, Cabbage, Cooked dried beans, Carbonated beverages (sparkling water, soda, beer, champagne)  Other Causes Of Excess Gas Include:   1) EATING TOO FAST or TALKING WHILE YOU CHEW may cause you to swallow air. This increases the amount of gas in the stomach and may worsen your symptoms.  --> Chew each mouthful completely before swallowing. Take your time.  2) OVEREATING may increase the feeling of being bloated and cause more gas.  --> When you are full, stop eating.  3) CONSTIPATION can increase the amount of normal intestinal gas.  --> Avoid constipation by increasing the  amount of fiber in your diet by including whole cereal grains, fresh vegetables (except those in the above list) and fresh fruits. High-fiber foods absorb water and carry it out of the body. When increasing the amount of fiber in your diet, you also need to increase the amount of water that you drink. You should drink at least eight 8-ounce glasses of water (two quarts) per day.  -testing for H. Pylori typically performed during EGD    Nausea  - schedule EGD, discussed procedure with patient, including risks and benefits, patient verbalized understanding    History of gastritis  - schedule EGD, discussed procedure with patient, including risks and benefits, patient verbalized understanding  -START: pantoprazole (PROTONIX) 40 MG tablet; Take 1 tablet (40 mg total) by mouth once daily.  Dispense: 30 tablet; Refill: 2    History of iron deficiency  -follow-up with PCP and/or hematology for continued evaluation and management    Urinary retention  -follow-up with PCP for continued evaluation and management; if a specialist as needed I recommend a urologist    Follow up in about 4 weeks (around 5/26/2023), or if symptoms worsen or fail to improve.      If no improvement in symptoms or symptoms worsen, call/follow-up at clinic or go to ER.        45 minutes of total time spent on the encounter, which includes face to face time and non-face to face time preparing to see the patient (eg, review of tests), Obtaining and/or reviewing separately obtained history, Documenting clinical information in the electronic or other health record, Independently interpreting results (not separately reported) and communicating results to the patient/family/caregiver, or Care coordination (not separately reported).     A dictation software program was used for this note. Please expect some simple typographical  errors in this note.

## 2023-05-01 ENCOUNTER — PATIENT MESSAGE (OUTPATIENT)
Dept: ALLERGY | Facility: CLINIC | Age: 33
End: 2023-05-01

## 2023-05-01 ENCOUNTER — TELEPHONE (OUTPATIENT)
Dept: ALLERGY | Facility: CLINIC | Age: 33
End: 2023-05-01

## 2023-05-01 DIAGNOSIS — Z91.018 FOOD ALLERGY: Primary | ICD-10-CM

## 2023-05-01 NOTE — TELEPHONE ENCOUNTER
Pt would like [facility] labs.     Food allergy  -     Milk IgE; Future; Expected date: 05/01/2023  -     Wheat IgE; Future; Expected date: 05/01/2023

## 2023-05-03 ENCOUNTER — HOSPITAL ENCOUNTER (OUTPATIENT)
Dept: RADIOLOGY | Facility: HOSPITAL | Age: 33
Discharge: HOME | End: 2023-05-03
Attending: FAMILY MEDICINE
Payer: COMMERCIAL

## 2023-05-03 ENCOUNTER — PATIENT MESSAGE (OUTPATIENT)
Dept: GASTROENTEROLOGY | Facility: CLINIC | Age: 33
End: 2023-05-03
Payer: COMMERCIAL

## 2023-05-03 DIAGNOSIS — R10.11 RUQ PAIN: ICD-10-CM

## 2023-05-03 DIAGNOSIS — K76.9 LIVER LESION: Primary | ICD-10-CM

## 2023-05-03 DIAGNOSIS — R10.11 RIGHT UPPER QUADRANT ABDOMINAL PAIN: ICD-10-CM

## 2023-05-03 PROCEDURE — 76705 US ABDOMEN LIMITED: ICD-10-PCS | Mod: 26,,, | Performed by: RADIOLOGY

## 2023-05-03 PROCEDURE — 76705 ECHO EXAM OF ABDOMEN: CPT | Mod: TC

## 2023-05-03 PROCEDURE — 76705 ECHO EXAM OF ABDOMEN: CPT | Mod: 26,,, | Performed by: RADIOLOGY

## 2023-05-04 ENCOUNTER — PATIENT MESSAGE (OUTPATIENT)
Dept: FAMILY MEDICINE | Facility: CLINIC | Age: 33
End: 2023-05-04
Payer: COMMERCIAL

## 2023-05-04 DIAGNOSIS — K80.20 CALCULUS OF GALLBLADDER WITHOUT CHOLECYSTITIS WITHOUT OBSTRUCTION: Primary | ICD-10-CM

## 2023-05-08 ENCOUNTER — PATIENT MESSAGE (OUTPATIENT)
Dept: FAMILY MEDICINE | Facility: CLINIC | Age: 33
End: 2023-05-08
Payer: COMMERCIAL

## 2023-05-08 DIAGNOSIS — Z77.021 EXPOSURE TO BENZENE: ICD-10-CM

## 2023-05-08 DIAGNOSIS — R11.0 NAUSEA: ICD-10-CM

## 2023-05-08 DIAGNOSIS — N92.0 MENORRHAGIA WITH REGULAR CYCLE: Primary | ICD-10-CM

## 2023-05-08 RX ORDER — ONDANSETRON 4 MG/1
TABLET, ORALLY DISINTEGRATING ORAL
Qty: 30 TABLET | Refills: 0 | Status: SHIPPED | OUTPATIENT
Start: 2023-05-08

## 2023-05-09 ENCOUNTER — HOSPITAL ENCOUNTER (OUTPATIENT)
Dept: RADIOLOGY | Facility: HOSPITAL | Age: 33
Discharge: HOME | End: 2023-05-09
Attending: FAMILY MEDICINE
Payer: COMMERCIAL

## 2023-05-09 DIAGNOSIS — R10.11 RIGHT UPPER QUADRANT ABDOMINAL PAIN: ICD-10-CM

## 2023-05-09 DIAGNOSIS — K76.9 LIVER LESION: ICD-10-CM

## 2023-05-09 PROCEDURE — 74177 CT ABDOMEN PELVIS WITH CONTRAST: ICD-10-PCS | Mod: 26,,, | Performed by: RADIOLOGY

## 2023-05-09 PROCEDURE — 74177 CT ABD & PELVIS W/CONTRAST: CPT | Mod: 26,,, | Performed by: RADIOLOGY

## 2023-05-09 PROCEDURE — 74177 CT ABD & PELVIS W/CONTRAST: CPT | Mod: TC

## 2023-05-09 PROCEDURE — 25500020 PHARM REV CODE 255

## 2023-05-09 RX ADMIN — IOHEXOL 100 ML: 350 INJECTION, SOLUTION INTRAVENOUS at 03:05

## 2023-05-10 ENCOUNTER — TELEPHONE (OUTPATIENT)
Dept: GASTROENTEROLOGY | Facility: CLINIC | Age: 33
End: 2023-05-10
Payer: COMMERCIAL

## 2023-05-10 NOTE — TELEPHONE ENCOUNTER
Call placed to Ms. Eagle, informed her that we do not have any sooner procedure dates at this time. She verbalized understanding. No further issues noted.

## 2023-05-10 NOTE — TELEPHONE ENCOUNTER
----- Message from Smiley Nichole sent at 5/10/2023  9:07 AM CDT -----  Regarding: pt call back/appt access  Name of Who is Calling: TSERING CUELLAR [[id number]]        What is the request in detail: Pt is calling to move her 7/13 and 7/27 procedures up. Would like a call back to discuss. Please advise.         Can the clinic reply by [facility]: no           What Number to Call Back if not in [facility]: [phone]

## 2023-05-11 ENCOUNTER — OFFICE VISIT (OUTPATIENT)
Dept: SURGERY | Facility: CLINIC | Age: 33
End: 2023-05-11
Payer: COMMERCIAL

## 2023-05-11 VITALS
OXYGEN SATURATION: 98 % | HEART RATE: 96 BPM | DIASTOLIC BLOOD PRESSURE: 73 MMHG | SYSTOLIC BLOOD PRESSURE: 115 MMHG | BODY MASS INDEX: 21.66 KG/M2 | WEIGHT: 130 LBS | HEIGHT: 65 IN

## 2023-05-11 DIAGNOSIS — K80.20 CALCULUS OF GALLBLADDER WITHOUT CHOLECYSTITIS WITHOUT OBSTRUCTION: Primary | ICD-10-CM

## 2023-05-11 DIAGNOSIS — D50.9 IRON DEFICIENCY ANEMIA, UNSPECIFIED IRON DEFICIENCY ANEMIA TYPE: ICD-10-CM

## 2023-05-11 DIAGNOSIS — K56.41: ICD-10-CM

## 2023-05-11 PROCEDURE — 1159F PR MEDICATION LIST DOCUMENTED IN MEDICAL RECORD: ICD-10-PCS | Mod: CPTII,S$GLB,, | Performed by: SURGERY

## 2023-05-11 PROCEDURE — 3078F PR MOST RECENT DIASTOLIC BLOOD PRESSURE < 80 MM HG: ICD-10-PCS | Mod: CPTII,S$GLB,, | Performed by: SURGERY

## 2023-05-11 PROCEDURE — 3074F SYST BP LT 130 MM HG: CPT | Mod: CPTII,S$GLB,, | Performed by: SURGERY

## 2023-05-11 PROCEDURE — 99205 PR OFFICE/OUTPT VISIT, NEW, LEVL V, 60-74 MIN: ICD-10-PCS | Mod: S$GLB,,, | Performed by: SURGERY

## 2023-05-11 PROCEDURE — 3008F BODY MASS INDEX DOCD: CPT | Mod: CPTII,S$GLB,, | Performed by: SURGERY

## 2023-05-11 PROCEDURE — 99999 PR PBB SHADOW E&M-EST. PATIENT-LVL V: ICD-10-PCS | Mod: PBBFAC,,, | Performed by: SURGERY

## 2023-05-11 PROCEDURE — 99999 PR PBB SHADOW E&M-EST. PATIENT-LVL V: CPT | Mod: PBBFAC,,, | Performed by: SURGERY

## 2023-05-11 PROCEDURE — 3078F DIAST BP <80 MM HG: CPT | Mod: CPTII,S$GLB,, | Performed by: SURGERY

## 2023-05-11 PROCEDURE — 99205 OFFICE O/P NEW HI 60 MIN: CPT | Mod: S$GLB,,, | Performed by: SURGERY

## 2023-05-11 PROCEDURE — 3008F PR BODY MASS INDEX (BMI) DOCUMENTED: ICD-10-PCS | Mod: CPTII,S$GLB,, | Performed by: SURGERY

## 2023-05-11 PROCEDURE — 1159F MED LIST DOCD IN RCRD: CPT | Mod: CPTII,S$GLB,, | Performed by: SURGERY

## 2023-05-11 PROCEDURE — 3074F PR MOST RECENT SYSTOLIC BLOOD PRESSURE < 130 MM HG: ICD-10-PCS | Mod: CPTII,S$GLB,, | Performed by: SURGERY

## 2023-05-11 RX ORDER — HEPARIN 100 UNIT/ML
500 SYRINGE INTRAVENOUS
Status: CANCELLED | OUTPATIENT
Start: 2023-05-18

## 2023-05-11 RX ORDER — SODIUM CHLORIDE 0.9 % (FLUSH) 0.9 %
10 SYRINGE (ML) INJECTION
Status: CANCELLED | OUTPATIENT
Start: 2023-05-18

## 2023-05-11 RX ORDER — SODIUM CHLORIDE 9 MG/ML
INJECTION, SOLUTION INTRAVENOUS CONTINUOUS
Status: CANCELLED | OUTPATIENT
Start: 2023-05-11

## 2023-05-11 NOTE — H&P
[facility] H&P Note    Subjective:       Patient ID: Shagufta Bedoya is a 32 y.o. female.    Chief Complaint:  Abdominal symptomatology.  Abnormal ultrasound.  Abnormal CT scan.  Abnormal lab studies.  HPI:  Shagufta Bedoya is a 32 y.o. female history of ADHD anemia anxiety fibromyalgia presents today as a new patient referral from Dr. Suárez for evaluation of abdominal symptomatology, abnormal ultrasound, abnormal CT scan and abnormal lab studies.  Patient states that last few weeks she is been having abdominal pain.  Right-sided.  Radiating to the back.  Between the shoulder blades.  Associated with nausea no vomiting.  Bloating present.  Patient underwent ultrasound gallbladder further evaluation which showed evidence of gallstones.  Concern for liver mass.  Patient underwent CT scan for evaluation as well as routine labs with CT scan.  CT scan confirmed no evidence of liver mass.  Gallstones confirmed.  Additionally there was evidence of appendiceal stranding inflammation as well as fecalith.  Further with the lab studies there is found to be evidence of low iron level, and significant anemia with a hemoglobin just over 7.  Patient presents today surgery Clinic for evaluation.    Past Medical History:   Diagnosis Date    ADHD (attention deficit hyperactivity disorder)     Anemia     Anxiety     Anxiety state 5/13/2021 10:50:43 AM    [facility] Historical - Quick Add: Anxiety and depression-No Additional Notes    Attention deficit disorder 5/13/2021 11:14:06 AM    [facility] Historical - Other: ADD (attention deficit disorder)-No Additional Notes    Esophageal reflux 5/13/2021 11:14:13 AM    [facility] Historical - Unknown: Acid reflux disease-No Additional Notes    Fibromyalgia 2015    Fibromyalgia      Past Surgical History:   Procedure Laterality Date    AUGMENTATION OF BREAST  10/07/2022    COLONOSCOPY      UPPER GASTROINTESTINAL ENDOSCOPY      WISDOM TOOTH EXTRACTION       Family History    Problem Relation Age of Onset    Colon cancer Mother 76    Cancer Mother         cervical     Cancer Sister         cervical    Colon polyps Neg Hx     Esophageal cancer Neg Hx     Stomach cancer Neg Hx      Social History     Socioeconomic History    Marital status:    Tobacco Use    Smoking status: Never    Smokeless tobacco: Never   Substance and Sexual Activity    Alcohol use: Yes     Comment: occasional     Drug use: No    Sexual activity: Yes     Birth control/protection: Implant       Current Outpatient Medications   Medication Sig Dispense Refill    amitriptyline (ELAVIL) 10 MG tablet Take 2 tablets (20 mg total) by mouth nightly as needed for Insomnia. 180 tablet 2    drospirenone-ethinyl estradioL (YUSRA) 3-0.02 mg per tablet Take 1 tablet by mouth once daily. 30 tablet 11    ketoconazole (NIZORAL) 2 % shampoo Apply topically twice a week. 120 mL 11    L norgest/e.estradioL-e.estrad (SEASONIQUE) 0.15 mg-30 mcg (84)/10 mcg (7) 3MPk Take 1 tablet by mouth once daily. 91 each 0    lubiprostone (AMITIZA) 24 MCG Cap Take 1 capsule (24 mcg total) by mouth 2 (two) times daily. 60 capsule 0    methylphenidate HCl (RITALIN LA) 10 MG 24 hr capsule Take 1 capsule (10 mg total) by mouth every morning. 30 capsule 0    ondansetron (ZOFRAN-ODT) 4 MG TbDL DISSOLVE 1 TABLET(4 MG) ON THE TONGUE EVERY 6 HOURS AS NEEDED FOR NAUSEA 30 tablet 0    pantoprazole (PROTONIX) 40 MG tablet Take 1 tablet (40 mg total) by mouth once daily. 30 tablet 2    scopolamine (TRANSDERM-SCOP) 1.3-1.5 mg (1 mg over 3 days) Place 1 patch onto the skin every 72 hours. 3 patch 0    busPIRone (BUSPAR) 5 MG Tab Take 1 tablet (5 mg total) by mouth 2 (two) times daily. 60 tablet 11     Current Facility-Administered Medications   Medication Dose Route Frequency Provider Last Rate Last Admin    ceFOXItin (MEFOXIN) 2 g in dextrose 5 % (D5W) 50 mL IVPB  2 g Intravenous On Call Procedure Brodie Garcia MD         Review of patient's allergies  "indicates:   Allergen Reactions    Sulfa (sulfonamide antibiotics) Hives       Review of Systems   Constitutional:  Negative for activity change, appetite change, chills and fever.   HENT:  Negative for congestion, dental problem and ear discharge.    Eyes:  Negative for discharge and itching.   Respiratory:  Negative for apnea, choking and chest tightness.    Cardiovascular:  Negative for chest pain and leg swelling.   Gastrointestinal:  Positive for abdominal pain and nausea. Negative for abdominal distention, anal bleeding, constipation and diarrhea.   Endocrine: Negative for cold intolerance and heat intolerance.   Genitourinary:  Negative for difficulty urinating and dyspareunia.   Musculoskeletal:  Negative for arthralgias and back pain.   Skin:  Negative for color change and pallor.   Neurological:  Negative for dizziness and facial asymmetry.   Hematological:  Negative for adenopathy. Does not bruise/bleed easily.   Psychiatric/Behavioral:  Negative for agitation and behavioral problems.      Objective:      Vitals:    05/11/23 0810   BP: 115/73   Pulse: 96   SpO2: 98%   Weight: 59 kg (130 lb)   Height: 5' 5" (1.651 m)     Physical Exam  Constitutional:       General: She is not in acute distress.     Appearance: She is well-developed.   HENT:      Head: Normocephalic and atraumatic.   Eyes:      Pupils: Pupils are equal, round, and reactive to light.   Neck:      Thyroid: No thyromegaly.   Cardiovascular:      Rate and Rhythm: Normal rate and regular rhythm.   Pulmonary:      Effort: Pulmonary effort is normal.      Breath sounds: Normal breath sounds.   Abdominal:      General: Bowel sounds are normal. There is no distension.      Palpations: Abdomen is soft.      Tenderness: There is abdominal tenderness in the right upper quadrant, right lower quadrant and epigastric area.   Musculoskeletal:         General: No deformity. Normal range of motion.      Cervical back: Normal range of motion and neck supple. "   Skin:     General: Skin is warm.      Capillary Refill: Capillary refill takes less than 2 seconds.      Findings: No erythema.   Neurological:      Mental Status: She is alert and oriented to person, place, and time.      Cranial Nerves: No cranial nerve deficit.   Psychiatric:         Behavior: Behavior normal.       Lab Review: CBC:   Lab Results   Component Value Date    WBC 9.23 05/09/2023    RBC 3.92 (L) 05/09/2023    HGB 7.7 (L) 05/09/2023    HCT 26.8 (L) 05/09/2023     05/09/2023     BMP:   Lab Results   Component Value Date    GLU 81 05/09/2023     05/09/2023    K 3.6 05/09/2023     05/09/2023    CO2 22 (L) 05/09/2023    BUN 6 05/09/2023    CREATININE 0.7 05/09/2023    CALCIUM 9.0 05/09/2023     Diagnostics Review: US: Reviewed  CT: Reviewed  Ultrasound CT scan reviewed.  Cholelithiasis.  Fecalith with the appendiceal stranding.     Assessment:       1. Calculus of gallbladder without cholecystitis without obstruction    2. Fecalith    3. Iron deficiency anemia, unspecified iron deficiency anemia type        Plan:   Calculus of gallbladder without cholecystitis without obstruction  -     Ambulatory referral/consult to General Surgery  -     Full code; Standing  -     Place in Outpatient; Standing  -     Case Request Operating Room: CHOLECYSTECTOMY-LAPAROSCOPIC, APPENDECTOMY, LAPAROSCOPIC  -     Vital signs; Standing  -     Insert peripheral IV; Standing  -     Verify beta-blocker dose taken within 24 hours if patient is prescribed beta-blocker; Standing  -     Height and weight; Standing  -     Intake and output; Standing  -     Verify discontinuation of antithrombotics; Standing  -     POCT glucose; Standing  -     Verify blood consent; Standing  -     Verify consent; Standing  -     Clip and Prep Abdomen Zyphoid to Pubis; Standing  -     Chlorhexidine (CHG) 2% Wipes; Standing  -     Hibiclens shower; Standing  -     Hibiclens shower; Standing  -     Notify Physician; Standing  -      Diet NPO; Standing  -     Place JOHNNA hose; Standing  -     Place sequential compression device; Standing    Fecalith  -     Full code; Standing  -     Place in Outpatient; Standing  -     Case Request Operating Room: CHOLECYSTECTOMY-LAPAROSCOPIC, APPENDECTOMY, LAPAROSCOPIC  -     Vital signs; Standing  -     Insert peripheral IV; Standing  -     Verify beta-blocker dose taken within 24 hours if patient is prescribed beta-blocker; Standing  -     Height and weight; Standing  -     Intake and output; Standing  -     Verify discontinuation of antithrombotics; Standing  -     POCT glucose; Standing  -     Verify blood consent; Standing  -     Verify consent; Standing  -     Clip and Prep Abdomen Zyphoid to Pubis; Standing  -     Chlorhexidine (CHG) 2% Wipes; Standing  -     Hibiclens shower; Standing  -     Hibiclens shower; Standing  -     Notify Physician; Standing  -     Diet NPO; Standing  -     Place JOHNNA hose; Standing  -     Place sequential compression device; Standing    Iron deficiency anemia, unspecified iron deficiency anemia type  -     CBC Auto Differential; Future; Expected date: 05/11/2023  -     Iron and TIBC; Future; Expected date: 05/11/2023    Other orders  -     0.9%  NaCl infusion  -     IP VTE LOW RISK PATIENT; Standing  -     ceFOXItin (MEFOXIN) 2 g in dextrose 5 % (D5W) 50 mL IVPB        Medical Decision Making/Counseling:  Multiple medical issues.  Complex patient.      Most prevailing issue at this point is the severe anemia with low ferritin level hemoglobin just over 7 with a history of anemia.  Likely chronic blood loss anemia.  Recommendation will be Venofer infusions 200 mg weekly given the severe anemia and need for pending surgery for other medical conditions which can not weight.  With delaying infusions of iron, we could delay surgery possibly necessitating the patient to go through the ER if emergent issues were to happen including cholecystitis gallstone pancreatitis liver dysfunction  or acute appendicitis which could need emergent surgical intervention possible blood transfusion.  Recommendation will be for Venofer infusions 200 mg IV weekly for the next 6-8 weeks repeat iron studies and CBC after that.  If both are showing significant improvement and getting the patient out of the danger zone for needing blood transfusion will proceed with surgical intervention at that time.      Will recommend surgical appendectomy as well as surgical cholecystectomy.  Gallstones present.  With symptoms.  Additionally right-sided abdominal pain with fecalith and appendiceal stranding.  Patient is pending the need for both surgical interventions possibly emergently if not taken care of in the next few months.  Risk benefits as well as alternatives were discussed in detail with the patient in clinic today.  After risk benefits discussion, patient voiced understanding risk benefits and desires to proceed with both surgical interventions.  All questions were answered to patient's satisfaction.      Risks and benefits of cholecystectomy were discussed with the patient. Benefits the patient could receive would be the resolution of gallbladder attacks causing pain, cramps, nausea vomiting etc.  Benefits also include eliminating the risk of the patient presenting through the ER with acute cholecystitis and needing an emergent operation.  Risks of cholecystectomy were also included in our discussion.  Risks include injury to the common bile duct (liver drain tube) occurring in 1 in 250 cholecystectomy is performed across the [place].  I discussed with the patient that if this were to occur she could need further surgeries to include likely a hepaticojejunostomy.  I discussed there is a possibility of transfer for surgical therapy for this, if it were to occur.  I also discussed the risk of conversion to an open procedure (cold fashion surgery, how gallbladders were taken out previously) to occur in 5% of  cholecystectomies.  I discussed that in 100% cholecystectomies I started with the small incision (laparoscopic technique).  Reason for conversion to an open procedure is related to bleeding, significant scarring or inflammation, inability to obtain a critical view which could lead to injury of surrounding structures to include the stomach, duodenum, IVC, common bile duct, etc.  I discussed there is also a possibility of postoperatively needing reoperation.  Additionally, I have discussed with the patient the possibilities of biliary leak after laparoscopic cholecystectomy.  Literature would suggest a 2% leak rate.  This could necessitate a postoperative ERCP or even postoperative procedure including diagnostic laparoscopy for drainage or percutaneous drainage.  There is also the intrinsic risks of any surgery to include bleeding and infection.  Patient understands all the above risks and benefits and wishes to proceed in the near future with laparoscopic versus open cholecystectomy.  Counseling time 60 minutes in clinic.  I drew a picture for the patient of the foregut liver biliary anatomy and tree for further understanding while in clinic.    Patient instructed that best way to communicate with my office staff is for patient to get on the [facility] portal to expedite communication and communication issues that may occur.  Patient was given instructions on how to get on the portal.  I encouraged patient to obtain portal access as well.  Ultimately it is up to the patient to obtain access.  Patient voiced understanding.

## 2023-05-12 ENCOUNTER — PATIENT MESSAGE (OUTPATIENT)
Dept: FAMILY MEDICINE | Facility: CLINIC | Age: 33
End: 2023-05-12
Payer: COMMERCIAL

## 2023-05-16 ENCOUNTER — INFUSION (OUTPATIENT)
Dept: INFUSION THERAPY | Facility: HOSPITAL | Age: 33
End: 2023-05-16
Attending: SURGERY
Payer: COMMERCIAL

## 2023-05-16 ENCOUNTER — TELEPHONE (OUTPATIENT)
Dept: GASTROENTEROLOGY | Facility: CLINIC | Age: 33
End: 2023-05-16
Payer: COMMERCIAL

## 2023-05-16 VITALS — SYSTOLIC BLOOD PRESSURE: 122 MMHG | HEART RATE: 104 BPM | DIASTOLIC BLOOD PRESSURE: 57 MMHG | TEMPERATURE: 99 F

## 2023-05-16 DIAGNOSIS — D50.0 IRON DEFICIENCY ANEMIA DUE TO CHRONIC BLOOD LOSS: Primary | ICD-10-CM

## 2023-05-16 PROCEDURE — 96365 THER/PROPH/DIAG IV INF INIT: CPT

## 2023-05-16 PROCEDURE — 25000003 PHARM REV CODE 250

## 2023-05-16 PROCEDURE — 63600175 PHARM REV CODE 636 W HCPCS

## 2023-05-16 RX ORDER — HEPARIN 100 UNIT/ML
500 SYRINGE INTRAVENOUS
Status: DISCONTINUED | OUTPATIENT
Start: 2023-05-16 | End: 2023-05-16 | Stop reason: HOSPADM

## 2023-05-16 RX ORDER — SODIUM CHLORIDE 0.9 % (FLUSH) 0.9 %
10 SYRINGE (ML) INJECTION
Status: DISCONTINUED | OUTPATIENT
Start: 2023-05-16 | End: 2023-05-16 | Stop reason: HOSPADM

## 2023-05-16 RX ORDER — SODIUM CHLORIDE 0.9 % (FLUSH) 0.9 %
10 SYRINGE (ML) INJECTION
Status: CANCELLED | OUTPATIENT
Start: 2023-05-23

## 2023-05-16 RX ORDER — HEPARIN 100 UNIT/ML
500 SYRINGE INTRAVENOUS
Status: CANCELLED | OUTPATIENT
Start: 2023-05-23

## 2023-05-16 RX ADMIN — IRON SUCROSE 200 MG: 20 INJECTION, SOLUTION INTRAVENOUS at 11:05

## 2023-05-16 NOTE — TELEPHONE ENCOUNTER
----- Message from Sruthi Chi sent at 5/16/2023  4:20 PM CDT -----  Contact: PT  Type:  RESCHEDULE Apoointment Request    Name of Caller: PT   When is the first available appointment?N/A   Symptoms:PLEASE RESCHEDULE BOTH PROCEDURES   Would the patient rather a call back or a response via [facility]? CALL   Best Call Back Number: [phone] (home)     Additional Information: PT HAS A PROCEDURE WITH HER GENERAL SURGENT ON 07/12/23 AND A POST OP FOR THAT PROCEDURE ON 07/27/23

## 2023-05-16 NOTE — PLAN OF CARE
Pleasant alert and oriented patient ambulated to clinic for venofer - pt tolerated well - discharged home with no concerns - pt to RTC next week

## 2023-05-16 NOTE — TELEPHONE ENCOUNTER
Patient rescheduled colonoscopy to  7/5 at 230pm arrive for 130pm and EGD to 8/18 at 830am arrive for 730am

## 2023-05-17 ENCOUNTER — PATIENT MESSAGE (OUTPATIENT)
Dept: SURGERY | Facility: CLINIC | Age: 33
End: 2023-05-17
Payer: COMMERCIAL

## 2023-05-17 ENCOUNTER — PATIENT MESSAGE (OUTPATIENT)
Dept: ENDOSCOPY | Facility: HOSPITAL | Age: 33
End: 2023-05-17
Payer: COMMERCIAL

## 2023-05-28 ENCOUNTER — PATIENT MESSAGE (OUTPATIENT)
Dept: FAMILY MEDICINE | Facility: CLINIC | Age: 33
End: 2023-05-28
Payer: COMMERCIAL

## 2023-05-30 ENCOUNTER — INFUSION (OUTPATIENT)
Dept: INFUSION THERAPY | Facility: HOSPITAL | Age: 33
End: 2023-05-30
Attending: SURGERY
Payer: COMMERCIAL

## 2023-05-30 VITALS
DIASTOLIC BLOOD PRESSURE: 57 MMHG | HEIGHT: 65 IN | BODY MASS INDEX: 21.66 KG/M2 | RESPIRATION RATE: 18 BRPM | SYSTOLIC BLOOD PRESSURE: 121 MMHG | OXYGEN SATURATION: 100 % | WEIGHT: 130 LBS | TEMPERATURE: 98 F | HEART RATE: 82 BPM

## 2023-05-30 DIAGNOSIS — K58.1 IRRITABLE BOWEL SYNDROME WITH CONSTIPATION: ICD-10-CM

## 2023-05-30 DIAGNOSIS — D50.0 IRON DEFICIENCY ANEMIA DUE TO CHRONIC BLOOD LOSS: Primary | ICD-10-CM

## 2023-05-30 PROCEDURE — 25000003 PHARM REV CODE 250

## 2023-05-30 PROCEDURE — 63600175 PHARM REV CODE 636 W HCPCS

## 2023-05-30 PROCEDURE — 96365 THER/PROPH/DIAG IV INF INIT: CPT

## 2023-05-30 RX ORDER — HEPARIN 100 UNIT/ML
500 SYRINGE INTRAVENOUS
Status: CANCELLED | OUTPATIENT
Start: 2023-06-06

## 2023-05-30 RX ORDER — SODIUM CHLORIDE 0.9 % (FLUSH) 0.9 %
10 SYRINGE (ML) INJECTION
Status: CANCELLED | OUTPATIENT
Start: 2023-06-06

## 2023-05-30 RX ORDER — SODIUM CHLORIDE 0.9 % (FLUSH) 0.9 %
10 SYRINGE (ML) INJECTION
Status: DISCONTINUED | OUTPATIENT
Start: 2023-05-30 | End: 2023-05-30 | Stop reason: HOSPADM

## 2023-05-30 RX ORDER — HEPARIN 100 UNIT/ML
500 SYRINGE INTRAVENOUS
Status: DISCONTINUED | OUTPATIENT
Start: 2023-05-30 | End: 2023-05-30 | Stop reason: HOSPADM

## 2023-05-30 RX ADMIN — IRON SUCROSE 200 MG: 20 INJECTION, SOLUTION INTRAVENOUS at 08:05

## 2023-05-30 RX ADMIN — SODIUM CHLORIDE: 9 INJECTION, SOLUTION INTRAVENOUS at 08:05

## 2023-05-30 NOTE — PLAN OF CARE
"Problem: Adult Inpatient Plan of Care  Goal: Plan of Care Review  Outcome: Ongoing, Progressing  Flowsheets (Taken 5/30/2023 0847)  Plan of Care Reviewed With: patient  BP (!) 115/59 (BP Location: Left arm, Patient Position: Sitting)   Pulse 88   Temp 98.1 °F (36.7 °C) (Oral)   Resp 18   Ht 5' 5" (1.651 m)   Wt 59 kg (130 lb)   SpO2 100%   BMI 21.63 kg/m² Pleasant alert and oriented patient ambulated to clinic for venofer - pt tolerated well - discharged home with no concerns - pt to RTC next week       "

## 2023-05-31 ENCOUNTER — PATIENT MESSAGE (OUTPATIENT)
Dept: ENDOSCOPY | Facility: HOSPITAL | Age: 33
End: 2023-05-31
Payer: COMMERCIAL

## 2023-05-31 DIAGNOSIS — F90.9 ADULT ADHD: ICD-10-CM

## 2023-05-31 RX ORDER — METHYLPHENIDATE HYDROCHLORIDE 10 MG/1
10 CAPSULE, EXTENDED RELEASE ORAL EVERY MORNING
Qty: 30 CAPSULE | Refills: 0 | Status: SHIPPED | OUTPATIENT
Start: 2023-05-31 | End: 2023-07-09 | Stop reason: SDUPTHER

## 2023-06-01 RX ORDER — LUBIPROSTONE 24 UG/1
CAPSULE ORAL
Qty: 60 CAPSULE | Refills: 0 | Status: ON HOLD | OUTPATIENT
Start: 2023-06-01 | End: 2023-07-05 | Stop reason: CLARIF

## 2023-06-06 ENCOUNTER — INFUSION (OUTPATIENT)
Dept: INFUSION THERAPY | Facility: HOSPITAL | Age: 33
End: 2023-06-06
Attending: SURGERY
Payer: COMMERCIAL

## 2023-06-06 VITALS
BODY MASS INDEX: 21.66 KG/M2 | WEIGHT: 130 LBS | HEIGHT: 65 IN | HEART RATE: 78 BPM | TEMPERATURE: 98 F | RESPIRATION RATE: 16 BRPM | SYSTOLIC BLOOD PRESSURE: 110 MMHG | DIASTOLIC BLOOD PRESSURE: 59 MMHG | OXYGEN SATURATION: 100 %

## 2023-06-06 DIAGNOSIS — D50.0 IRON DEFICIENCY ANEMIA DUE TO CHRONIC BLOOD LOSS: Primary | ICD-10-CM

## 2023-06-06 PROCEDURE — 63600175 PHARM REV CODE 636 W HCPCS: Performed by: SURGERY

## 2023-06-06 PROCEDURE — 96365 THER/PROPH/DIAG IV INF INIT: CPT

## 2023-06-06 PROCEDURE — 25000003 PHARM REV CODE 250: Performed by: SURGERY

## 2023-06-06 RX ORDER — HEPARIN 100 UNIT/ML
500 SYRINGE INTRAVENOUS
Status: DISCONTINUED | OUTPATIENT
Start: 2023-06-06 | End: 2023-06-06 | Stop reason: HOSPADM

## 2023-06-06 RX ORDER — HEPARIN 100 UNIT/ML
500 SYRINGE INTRAVENOUS
Status: CANCELLED | OUTPATIENT
Start: 2023-06-13

## 2023-06-06 RX ORDER — SODIUM CHLORIDE 0.9 % (FLUSH) 0.9 %
10 SYRINGE (ML) INJECTION
Status: CANCELLED | OUTPATIENT
Start: 2023-06-13

## 2023-06-06 RX ORDER — SODIUM CHLORIDE 0.9 % (FLUSH) 0.9 %
10 SYRINGE (ML) INJECTION
Status: DISCONTINUED | OUTPATIENT
Start: 2023-06-06 | End: 2023-06-06 | Stop reason: HOSPADM

## 2023-06-06 RX ADMIN — IRON SUCROSE 200 MG: 20 INJECTION, SOLUTION INTRAVENOUS at 08:06

## 2023-06-06 RX ADMIN — SODIUM CHLORIDE: 9 INJECTION, SOLUTION INTRAVENOUS at 08:06

## 2023-06-06 NOTE — NURSING
PT tolerated Venofer infusion well. No adverse reaction noted. Pt education reinforced on Venofer side effects, what to expect and when to call Dr. Brodie Garcia. Pt verbalized understanding. PIV d/c per protocol, pt tolerated well.

## 2023-06-06 NOTE — PLAN OF CARE
"Problem: Adult Inpatient Plan of Care  Goal: Plan of Care Review  Outcome: Ongoing, Progressing  Flowsheets (Taken 6/6/2023 0821)  Plan of Care Reviewed With: patient  BP (!) 110/59 (BP Location: Left arm, Patient Position: Sitting)   Pulse 78   Temp 98.1 °F (36.7 °C) (Oral)   Resp 16   Ht 5' 5" (1.651 m)   Wt 59 kg (130 lb)   SpO2 100%   BMI 21.63 kg/m² Pleasant alert and oriented patient ambulated to clinic for venofer - pt tolerated well - discharged home with no concerns - pt to RTC next week       "

## 2023-06-13 ENCOUNTER — INFUSION (OUTPATIENT)
Dept: INFUSION THERAPY | Facility: HOSPITAL | Age: 33
End: 2023-06-13
Attending: SURGERY
Payer: COMMERCIAL

## 2023-06-13 VITALS
OXYGEN SATURATION: 99 % | DIASTOLIC BLOOD PRESSURE: 62 MMHG | BODY MASS INDEX: 21.66 KG/M2 | SYSTOLIC BLOOD PRESSURE: 120 MMHG | TEMPERATURE: 98 F | WEIGHT: 130 LBS | RESPIRATION RATE: 18 BRPM | HEART RATE: 91 BPM | HEIGHT: 65 IN

## 2023-06-13 DIAGNOSIS — D50.0 IRON DEFICIENCY ANEMIA DUE TO CHRONIC BLOOD LOSS: Primary | ICD-10-CM

## 2023-06-13 PROCEDURE — 63600175 PHARM REV CODE 636 W HCPCS: Performed by: SURGERY

## 2023-06-13 PROCEDURE — 25000003 PHARM REV CODE 250: Performed by: SURGERY

## 2023-06-13 PROCEDURE — 96365 THER/PROPH/DIAG IV INF INIT: CPT

## 2023-06-13 RX ORDER — SODIUM CHLORIDE 0.9 % (FLUSH) 0.9 %
10 SYRINGE (ML) INJECTION
Status: DISCONTINUED | OUTPATIENT
Start: 2023-06-13 | End: 2023-06-13 | Stop reason: HOSPADM

## 2023-06-13 RX ORDER — HEPARIN 100 UNIT/ML
500 SYRINGE INTRAVENOUS
Status: CANCELLED | OUTPATIENT
Start: 2023-06-20

## 2023-06-13 RX ORDER — HEPARIN 100 UNIT/ML
500 SYRINGE INTRAVENOUS
Status: DISCONTINUED | OUTPATIENT
Start: 2023-06-13 | End: 2023-06-13 | Stop reason: HOSPADM

## 2023-06-13 RX ORDER — SODIUM CHLORIDE 0.9 % (FLUSH) 0.9 %
10 SYRINGE (ML) INJECTION
Status: CANCELLED | OUTPATIENT
Start: 2023-06-20

## 2023-06-13 RX ADMIN — IRON SUCROSE 200 MG: 20 INJECTION, SOLUTION INTRAVENOUS at 08:06

## 2023-06-13 RX ADMIN — SODIUM CHLORIDE: 9 INJECTION, SOLUTION INTRAVENOUS at 08:06

## 2023-06-16 ENCOUNTER — PATIENT MESSAGE (OUTPATIENT)
Dept: SURGERY | Facility: HOSPITAL | Age: 33
End: 2023-06-16
Payer: COMMERCIAL

## 2023-06-20 ENCOUNTER — INFUSION (OUTPATIENT)
Dept: INFUSION THERAPY | Facility: HOSPITAL | Age: 33
End: 2023-06-20
Attending: SURGERY
Payer: COMMERCIAL

## 2023-06-20 VITALS
RESPIRATION RATE: 18 BRPM | HEIGHT: 65 IN | OXYGEN SATURATION: 99 % | TEMPERATURE: 98 F | WEIGHT: 130 LBS | DIASTOLIC BLOOD PRESSURE: 67 MMHG | HEART RATE: 78 BPM | BODY MASS INDEX: 21.66 KG/M2 | SYSTOLIC BLOOD PRESSURE: 120 MMHG

## 2023-06-20 DIAGNOSIS — D50.0 IRON DEFICIENCY ANEMIA DUE TO CHRONIC BLOOD LOSS: Primary | ICD-10-CM

## 2023-06-20 PROCEDURE — 96365 THER/PROPH/DIAG IV INF INIT: CPT

## 2023-06-20 PROCEDURE — 25000003 PHARM REV CODE 250: Performed by: SURGERY

## 2023-06-20 PROCEDURE — 63600175 PHARM REV CODE 636 W HCPCS: Performed by: SURGERY

## 2023-06-20 RX ORDER — HEPARIN 100 UNIT/ML
500 SYRINGE INTRAVENOUS
Status: CANCELLED | OUTPATIENT
Start: 2023-06-27

## 2023-06-20 RX ORDER — SODIUM CHLORIDE 0.9 % (FLUSH) 0.9 %
10 SYRINGE (ML) INJECTION
Status: DISCONTINUED | OUTPATIENT
Start: 2023-06-20 | End: 2023-06-20 | Stop reason: HOSPADM

## 2023-06-20 RX ORDER — HEPARIN 100 UNIT/ML
500 SYRINGE INTRAVENOUS
Status: DISCONTINUED | OUTPATIENT
Start: 2023-06-20 | End: 2023-06-20 | Stop reason: HOSPADM

## 2023-06-20 RX ORDER — SODIUM CHLORIDE 0.9 % (FLUSH) 0.9 %
10 SYRINGE (ML) INJECTION
Status: CANCELLED | OUTPATIENT
Start: 2023-06-27

## 2023-06-20 RX ADMIN — IRON SUCROSE 200 MG: 20 INJECTION, SOLUTION INTRAVENOUS at 08:06

## 2023-06-20 RX ADMIN — SODIUM CHLORIDE: 9 INJECTION, SOLUTION INTRAVENOUS at 08:06

## 2023-06-20 NOTE — PLAN OF CARE
"Problem: Adult Inpatient Plan of Care  Goal: Plan of Care Review  Outcome: Ongoing, Progressing  Flowsheets (Taken 6/20/2023 9455)  Plan of Care Reviewed With: patient  BP (!) 118/58 (BP Location: Left arm, Patient Position: Sitting)   Pulse 98   Temp 98.1 °F (36.7 °C) (Oral)   Resp 18   Ht 5' 5" (1.651 m)   Wt 59 kg (130 lb)   SpO2 99%   BMI 21.63 kg/m² Pleasant alert and oriented patient ambulated to clinic for venofer - pt tolerated well - discharged home with no concerns - pt to RTC next week       "

## 2023-06-29 ENCOUNTER — INFUSION (OUTPATIENT)
Dept: INFUSION THERAPY | Facility: HOSPITAL | Age: 33
End: 2023-06-29
Attending: SURGERY
Payer: COMMERCIAL

## 2023-06-29 ENCOUNTER — PATIENT MESSAGE (OUTPATIENT)
Dept: SURGERY | Facility: HOSPITAL | Age: 33
End: 2023-06-29
Payer: COMMERCIAL

## 2023-06-29 VITALS
WEIGHT: 125 LBS | RESPIRATION RATE: 16 BRPM | HEIGHT: 65 IN | TEMPERATURE: 98 F | OXYGEN SATURATION: 100 % | HEART RATE: 76 BPM | DIASTOLIC BLOOD PRESSURE: 56 MMHG | SYSTOLIC BLOOD PRESSURE: 103 MMHG | BODY MASS INDEX: 20.83 KG/M2

## 2023-06-29 DIAGNOSIS — D50.0 IRON DEFICIENCY ANEMIA DUE TO CHRONIC BLOOD LOSS: Primary | ICD-10-CM

## 2023-06-29 PROCEDURE — 25000003 PHARM REV CODE 250: Performed by: SURGERY

## 2023-06-29 PROCEDURE — 63600175 PHARM REV CODE 636 W HCPCS: Performed by: SURGERY

## 2023-06-29 PROCEDURE — 96365 THER/PROPH/DIAG IV INF INIT: CPT

## 2023-06-29 RX ORDER — SODIUM CHLORIDE 0.9 % (FLUSH) 0.9 %
10 SYRINGE (ML) INJECTION
OUTPATIENT
Start: 2023-07-04

## 2023-06-29 RX ORDER — HEPARIN 100 UNIT/ML
500 SYRINGE INTRAVENOUS
OUTPATIENT
Start: 2023-07-04

## 2023-06-29 RX ORDER — SODIUM CHLORIDE 0.9 % (FLUSH) 0.9 %
10 SYRINGE (ML) INJECTION
Status: DISCONTINUED | OUTPATIENT
Start: 2023-06-29 | End: 2023-06-29 | Stop reason: HOSPADM

## 2023-06-29 RX ORDER — HEPARIN 100 UNIT/ML
500 SYRINGE INTRAVENOUS
Status: DISCONTINUED | OUTPATIENT
Start: 2023-06-29 | End: 2023-06-29 | Stop reason: HOSPADM

## 2023-06-29 RX ADMIN — IRON SUCROSE 200 MG: 20 INJECTION, SOLUTION INTRAVENOUS at 09:06

## 2023-06-29 RX ADMIN — SODIUM CHLORIDE: 9 INJECTION, SOLUTION INTRAVENOUS at 08:06

## 2023-06-29 NOTE — PLAN OF CARE
"Problem: Adult Inpatient Plan of Care  Goal: Plan of Care Review  Outcome: Ongoing, Progressing  Flowsheets (Taken 6/29/2023 4605)  Plan of Care Reviewed With: patient  /62 (BP Location: Left arm, Patient Position: Sitting)   Pulse 88   Temp 98.3 °F (36.8 °C) (Oral)   Resp 16   Ht 5' 5" (1.651 m)   Wt 56.7 kg (125 lb)   SpO2 100%   BMI 20.80 kg/m² Pleasant alert and oriented patient ambulated to clinic for venofer - pt tolerated well - discharged home with no concerns - all infusions completed       "

## 2023-07-05 ENCOUNTER — ANESTHESIA EVENT (OUTPATIENT)
Dept: ENDOSCOPY | Facility: HOSPITAL | Age: 33
End: 2023-07-05
Payer: COMMERCIAL

## 2023-07-05 ENCOUNTER — ANESTHESIA (OUTPATIENT)
Dept: ENDOSCOPY | Facility: HOSPITAL | Age: 33
End: 2023-07-05
Payer: COMMERCIAL

## 2023-07-05 ENCOUNTER — HOSPITAL ENCOUNTER (OUTPATIENT)
Facility: HOSPITAL | Age: 33
Discharge: HOME | End: 2023-07-05
Attending: INTERNAL MEDICINE | Admitting: INTERNAL MEDICINE
Payer: COMMERCIAL

## 2023-07-05 DIAGNOSIS — R19.4 CHANGE IN BOWEL HABITS: ICD-10-CM

## 2023-07-05 LAB
B-HCG UR QL: NEGATIVE
CTP QC/QA: YES

## 2023-07-05 PROCEDURE — 45380 COLONOSCOPY AND BIOPSY: CPT | Mod: ,,, | Performed by: INTERNAL MEDICINE

## 2023-07-05 PROCEDURE — 81025 URINE PREGNANCY TEST: CPT | Performed by: INTERNAL MEDICINE

## 2023-07-05 PROCEDURE — D9220A PRA ANESTHESIA: ICD-10-PCS | Mod: CRNA,,, | Performed by: NURSE ANESTHETIST, CERTIFIED REGISTERED

## 2023-07-05 PROCEDURE — 37000008 HC ANESTHESIA 1ST 15 MINUTES: Performed by: INTERNAL MEDICINE

## 2023-07-05 PROCEDURE — 37000009 HC ANESTHESIA EA ADD 15 MINS: Performed by: INTERNAL MEDICINE

## 2023-07-05 PROCEDURE — D9220A PRA ANESTHESIA: ICD-10-PCS | Mod: ANES,,, | Performed by: ANESTHESIOLOGY

## 2023-07-05 PROCEDURE — 45380 COLONOSCOPY AND BIOPSY: CPT | Performed by: INTERNAL MEDICINE

## 2023-07-05 PROCEDURE — 45380 PR COLONOSCOPY,BIOPSY: ICD-10-PCS | Mod: ,,, | Performed by: INTERNAL MEDICINE

## 2023-07-05 PROCEDURE — D9220A PRA ANESTHESIA: Mod: ANES,,, | Performed by: ANESTHESIOLOGY

## 2023-07-05 PROCEDURE — 63600175 PHARM REV CODE 636 W HCPCS: Performed by: NURSE ANESTHETIST, CERTIFIED REGISTERED

## 2023-07-05 PROCEDURE — 25000003 PHARM REV CODE 250: Performed by: NURSE ANESTHETIST, CERTIFIED REGISTERED

## 2023-07-05 PROCEDURE — 25000003 PHARM REV CODE 250: Performed by: INTERNAL MEDICINE

## 2023-07-05 PROCEDURE — 27201012 HC FORCEPS, HOT/COLD, DISP: Performed by: INTERNAL MEDICINE

## 2023-07-05 PROCEDURE — D9220A PRA ANESTHESIA: Mod: CRNA,,, | Performed by: NURSE ANESTHETIST, CERTIFIED REGISTERED

## 2023-07-05 RX ORDER — LIDOCAINE HYDROCHLORIDE 20 MG/ML
INJECTION, SOLUTION EPIDURAL; INFILTRATION; INTRACAUDAL; PERINEURAL
Status: DISCONTINUED | OUTPATIENT
Start: 2023-07-05 | End: 2023-07-05

## 2023-07-05 RX ORDER — SODIUM CHLORIDE 9 MG/ML
INJECTION, SOLUTION INTRAVENOUS CONTINUOUS
Status: DISCONTINUED | OUTPATIENT
Start: 2023-07-05 | End: 2023-07-05 | Stop reason: HOSPADM

## 2023-07-05 RX ORDER — PROPOFOL 10 MG/ML
VIAL (ML) INTRAVENOUS
Status: DISCONTINUED | OUTPATIENT
Start: 2023-07-05 | End: 2023-07-05

## 2023-07-05 RX ADMIN — PROPOFOL 50 MG: 10 INJECTION, EMULSION INTRAVENOUS at 01:07

## 2023-07-05 RX ADMIN — PROPOFOL 100 MG: 10 INJECTION, EMULSION INTRAVENOUS at 01:07

## 2023-07-05 RX ADMIN — LIDOCAINE HYDROCHLORIDE 100 MG: 20 INJECTION, SOLUTION EPIDURAL; INFILTRATION; INTRACAUDAL; PERINEURAL at 01:07

## 2023-07-05 RX ADMIN — SODIUM CHLORIDE: 9 INJECTION, SOLUTION INTRAVENOUS at 12:07

## 2023-07-05 NOTE — PLAN OF CARE
Vss, jose po fluids, denies pain, ambulates easily. IV removed, catheter intact. Discharge instructions provided and states understanding. States ready to go home.  Discharged from facility with family per wheelchair.

## 2023-07-05 NOTE — ANESTHESIA PREPROCEDURE EVALUATION
07/05/2023  Shagufta Bedoya is a 32 y.o., female.      Pre-op Assessment    I have reviewed the Patient Summary Reports.     I have reviewed the Nursing Notes. I have reviewed the NPO Status.   I have reviewed the Medications.     Review of Systems  Anesthesia Hx:  No problems with previous Anesthesia    Social:  Non-Smoker    Hematology/Oncology:         -- Anemia:   Cardiovascular:  Cardiovascular Normal     Pulmonary:  Pulmonary Normal    Renal/:  Renal/ Normal     Hepatic/GI:   GERD, well controlled    Neurological:   Neuromuscular Disease, Headaches    Endocrine:  Endocrine Normal    Psych:   Psychiatric History (ADHD) anxiety          Physical Exam  General: Well nourished, Cooperative, Alert and Oriented    Airway:  Mallampati: II   Mouth Opening: Normal  TM Distance: Normal  Neck ROM: Normal ROM        Anesthesia Plan  Type of Anesthesia, risks & benefits discussed:    Anesthesia Type: Gen ETT, Gen Supraglottic Airway, Gen Natural Airway, MAC  Intra-op Monitoring Plan: Standard ASA Monitors  Post Op Pain Control Plan: multimodal analgesia  Induction:  IV  Airway Plan: Direct, Video and Fiberoptic, Post-Induction  Informed Consent: Informed consent signed with the Patient and all parties understand the risks and agree with anesthesia plan.  All questions answered.   ASA Score: 2    Ready For Surgery From Anesthesia Perspective.     .

## 2023-07-05 NOTE — [employer] PATIENT INSTRUCTIONS
Discharge Summary/Instructions after an Endoscopic Procedure  Patient Name: Shagufta Bedoya  Patient MRN: [id number]  Patient YOB: 1990  Wednesday, July 5, 2023  Mirian Alford MD  Dear patient,  As a result of recent federal legislation (The Federal Cures Act), you may   receive lab or pathology results from your procedure in your [facility]   account before your physician is able to contact you. Your physician or   their representative will relay the results to you with their   recommendations at their soonest availability.  Thank you,  RESTRICTIONS:  During your procedure today, you received medications for sedation.  These   medications may affect your judgment, balance and coordination.  Therefore,   for 24 hours, you have the following restrictions:   - DO NOT drive a car, operate machinery, make legal/financial decisions,   sign important papers or drink alcohol.    ACTIVITY:  Today: no heavy lifting, straining or running due to procedural   sedation/anesthesia.  The following day: return to full activity including work.  DIET:  Eat and drink normally unless instructed otherwise.     TREATMENT FOR COMMON SIDE EFFECTS:  - Mild abdominal pain, nausea, belching, bloating or excessive gas:  rest,   eat lightly and use a heating pad.  - Sore Throat: treat with throat lozenges and/or gargle with warm salt   water.  - Because air was used during the procedure, expelling large amounts of air   from your rectum or belching is normal.  - If a bowel prep was taken, you may not have a bowel movement for 1-3 days.    This is normal.  SYMPTOMS TO WATCH FOR AND REPORT TO YOUR PHYSICIAN:  1. Abdominal pain or bloating, other than gas cramps.  2. Chest pain.  3. Back pain.  4. Signs of infection such as: chills or fever occurring within 24 hours   after the procedure.  5. Rectal bleeding, which would show as bright red, maroon, or black stools.   (A tablespoon of blood from the rectum is not serious,  especially if   hemorrhoids are present.)  6. Vomiting.  7. Weakness or dizziness.  GO DIRECTLY TO THE NEAREST EMERGENCY ROOM IF YOU HAVE ANY OF THE FOLLOWING:      Difficulty breathing              Chills and/or fever over 101 F   Persistent vomiting and/or vomiting blood   Severe abdominal pain   Severe chest pain   Black, tarry stools   Bleeding- more than one tablespoon   Any other symptom or condition that you feel may need urgent attention  Your doctor recommends these additional instructions:  If any biopsies were taken, your doctors clinic will contact you in 1 to 2   weeks with any results.  - Discharge patient to home (with escort).   - Patient has a contact number available for emergencies.  The signs and   symptoms of potential delayed complications were discussed with the   patient.  Return to normal activities tomorrow.  Written discharge   instructions were provided to the patient.   - Resume previous diet.   - Continue present medications.   - Await pathology results.   - Repeat colonoscopy at age 43 for screening purposes.   - Return to nurse practitioner PRN.  For questions, problems or results please call your physician - Mirian Alford MD at Work:  [phone].  [facility], EMERGENCY ROOM PHONE NUMBER: [phone]  IF A COMPLICATION OR EMERGENCY SITUATION ARISES AND YOU ARE UNABLE TO REACH   YOUR PHYSICIAN - GO DIRECTLY TO THE EMERGENCY ROOM.  Mirian Alford MD  7/5/2023 1:39:45 PM  This report has been verified and signed electronically.  Dear patient,  As a result of recent federal legislation (The Federal Cures Act), you may   receive lab or pathology results from your procedure in your [facility]   account before your physician is able to contact you. Your physician or   their representative will relay the results to you with their   recommendations at their soonest availability.  Thank you,  [employer]ATION

## 2023-07-05 NOTE — H&P
[facility] Note    CC: Change in bowel habits    HPI 32 y.o. female presents for evaluation of change in bowel habits    Past Medical History:   Diagnosis Date    ADHD (attention deficit hyperactivity disorder)     Anemia     Anxiety     Anxiety state 5/13/2021 10:50:43 AM    [facility] Historical - Quick Add: Anxiety and depression-No Additional Notes    Attention deficit disorder 5/13/2021 11:14:06 AM    [facility] Historical - Other: ADD (attention deficit disorder)-No Additional Notes    Esophageal reflux 5/13/2021 11:14:13 AM    [facility] Historical - Unknown: Acid reflux disease-No Additional Notes    Fibromyalgia 2015    Fibromyalgia        Allergies and Medications reviewed     Review of Systems  General ROS: negative for - chills, fever or weight loss  Cardiovascular ROS: no chest pain or dyspnea on exertion  Gastrointestinal ROS: + change in bowel habits    Physical Examination  Breastfeeding No   General appearance: alert, cooperative, no distress  HENT: Normocephalic, atraumatic, neck symmetrical, no nasal discharge, sclera anicteric   Lungs: clear to auscultation bilaterally, symmetric chest wall expansion bilaterally  Heart: regular rate and rhythm without rub; no displacement of the PMI   Abdomen: soft  Extremities: extremities symmetric; no clubbing, cyanosis, or edema        Labs:  Lab Results   Component Value Date    WBC 9.23 05/09/2023    HGB 7.7 (L) 05/09/2023    HCT 26.8 (L) 05/09/2023    MCV 68 (L) 05/09/2023     05/09/2023             Assessment:   32 y.o. female presents for colonoscopy    Plan:  -Proceed to colonoscopy     MD Liya [facility]  [address], [address]  [place] [address]  Office: [phone]  Fax: [phone]

## 2023-07-05 NOTE — TRANSFER OF CARE
Anesthesia Transfer of Care Note    Patient: Shagufta Bedoya    Procedure(s) Performed: Procedure(s) (LRB):  COLONOSCOPY (N/A)    Patient location: PACU    Anesthesia Type: general    Transport from OR: Transported from OR on room air with adequate spontaneous ventilation    Post pain: adequate analgesia    Post assessment: no apparent anesthetic complications and tolerated procedure well    Post vital signs: stable    Level of consciousness: sedated and responds to stimulation    Nausea/Vomiting: no nausea/vomiting    Complications: none    Transfer of care protocol was followed      Last vitals:   Visit Vitals  /61   Pulse 80   Temp 36.8 °C (98.2 °F) (Skin)   Resp 18   Wt 56.7 kg (125 lb)   SpO2 100%   Breastfeeding No   BMI 20.80 kg/m²

## 2023-07-05 NOTE — ANESTHESIA POSTPROCEDURE EVALUATION
Anesthesia Post Evaluation    Patient: Shagufta Bedoya    Procedure(s) Performed: Procedure(s) (LRB):  COLONOSCOPY (N/A)    Final Anesthesia Type: general      Patient location during evaluation: PACU  Patient participation: Yes- Able to Participate  Level of consciousness: awake and alert and oriented  Post-procedure vital signs: reviewed and stable  Pain management: adequate  Airway patency: patent    PONV status at discharge: No PONV  Anesthetic complications: no      Cardiovascular status: blood pressure returned to baseline and stable  Respiratory status: unassisted and spontaneous ventilation  Hydration status: euvolemic  Follow-up not needed.          Vitals Value Taken Time   /59 07/05/23 1400   Temp   07/05/23 1411   Pulse 90 07/05/23 1400   Resp 12 07/05/23 1400   SpO2 100 % 07/05/23 1400         No case tracking events are documented in the log.      Pain/Owen Score: Owen Score: 9 (7/5/2023  2:00 PM)

## 2023-07-06 ENCOUNTER — TELEPHONE (OUTPATIENT)
Dept: SURGERY | Facility: CLINIC | Age: 33
End: 2023-07-06
Payer: COMMERCIAL

## 2023-07-06 ENCOUNTER — LAB VISIT (OUTPATIENT)
Dept: LAB | Facility: HOSPITAL | Age: 33
End: 2023-07-06
Attending: SURGERY
Payer: COMMERCIAL

## 2023-07-06 ENCOUNTER — PATIENT MESSAGE (OUTPATIENT)
Dept: SURGERY | Facility: HOSPITAL | Age: 33
End: 2023-07-06
Payer: COMMERCIAL

## 2023-07-06 VITALS
HEART RATE: 90 BPM | RESPIRATION RATE: 12 BRPM | OXYGEN SATURATION: 100 % | DIASTOLIC BLOOD PRESSURE: 59 MMHG | WEIGHT: 125 LBS | SYSTOLIC BLOOD PRESSURE: 111 MMHG | BODY MASS INDEX: 20.8 KG/M2 | TEMPERATURE: 98 F

## 2023-07-06 DIAGNOSIS — D50.9 IRON DEFICIENCY ANEMIA, UNSPECIFIED IRON DEFICIENCY ANEMIA TYPE: Primary | ICD-10-CM

## 2023-07-06 DIAGNOSIS — D50.9 IRON DEFICIENCY ANEMIA, UNSPECIFIED IRON DEFICIENCY ANEMIA TYPE: ICD-10-CM

## 2023-07-06 LAB
BASOPHILS # BLD AUTO: 0.04 K/UL (ref 0–0.2)
BASOPHILS NFR BLD: 0.4 % (ref 0–1.9)
DACRYOCYTES BLD QL SMEAR: ABNORMAL
DIFFERENTIAL METHOD: ABNORMAL
EOSINOPHIL # BLD AUTO: 0 K/UL (ref 0–0.5)
EOSINOPHIL NFR BLD: 0.4 % (ref 0–8)
ERYTHROCYTE [DISTWIDTH] IN BLOOD BY AUTOMATED COUNT: ABNORMAL % (ref 11.5–14.5)
HCT VFR BLD AUTO: 37.3 % (ref 37–48.5)
HGB BLD-MCNC: 12 G/DL (ref 12–16)
IMM GRANULOCYTES # BLD AUTO: 0.04 K/UL (ref 0–0.04)
IMM GRANULOCYTES NFR BLD AUTO: 0.4 % (ref 0–0.5)
IRON SERPL-MCNC: 40 UG/DL (ref 30–160)
LYMPHOCYTES # BLD AUTO: 2 K/UL (ref 1–4.8)
LYMPHOCYTES NFR BLD: 18.8 % (ref 18–48)
MCH RBC QN AUTO: 25.9 PG (ref 27–31)
MCHC RBC AUTO-ENTMCNC: 32.2 G/DL (ref 32–36)
MCV RBC AUTO: 80 FL (ref 82–98)
MONOCYTES # BLD AUTO: 0.6 K/UL (ref 0.3–1)
MONOCYTES NFR BLD: 5.4 % (ref 4–15)
NEUTROPHILS # BLD AUTO: 7.8 K/UL (ref 1.8–7.7)
NEUTROPHILS NFR BLD: 74.6 % (ref 38–73)
NRBC BLD-RTO: 0 /100 WBC
OVALOCYTES BLD QL SMEAR: ABNORMAL
PLATELET # BLD AUTO: 249 K/UL (ref 150–450)
PMV BLD AUTO: 9.6 FL (ref 9.2–12.9)
RBC # BLD AUTO: 4.64 M/UL (ref 4–5.4)
ROULEAUX BLD QL SMEAR: PRESENT
SATURATED IRON: 12 % (ref 20–50)
TOTAL IRON BINDING CAPACITY: 340 UG/DL (ref 250–450)
TRANSFERRIN SERPL-MCNC: 230 MG/DL (ref 200–375)
WBC # BLD AUTO: 10.44 K/UL (ref 3.9–12.7)

## 2023-07-06 PROCEDURE — 84466 ASSAY OF TRANSFERRIN: CPT | Performed by: SURGERY

## 2023-07-06 PROCEDURE — 36415 COLL VENOUS BLD VENIPUNCTURE: CPT | Performed by: SURGERY

## 2023-07-06 PROCEDURE — 85025 COMPLETE CBC W/AUTO DIFF WBC: CPT | Performed by: SURGERY

## 2023-07-06 NOTE — TELEPHONE ENCOUNTER
Writer called patient and patient spoke directly to Dr Garcia. Patient expressed verbal understanding.

## 2023-07-06 NOTE — TELEPHONE ENCOUNTER
----- Message from Gilma Singh sent at 7/6/2023 12:11 PM CDT -----  Type: Needs Medical Advice  Who Called:  Pt  Best Call Back Number: [phone]  Additional Information: Pt is requesting a call to go over her lab results to discuss if she would move forward with procedure, pl call bk to advise thanks

## 2023-07-09 DIAGNOSIS — F90.9 ADULT ADHD: ICD-10-CM

## 2023-07-11 RX ORDER — METHYLPHENIDATE HYDROCHLORIDE 10 MG/1
10 CAPSULE, EXTENDED RELEASE ORAL EVERY MORNING
Qty: 30 CAPSULE | Refills: 0 | Status: SHIPPED | OUTPATIENT
Start: 2023-07-11 | End: 2023-10-30

## 2023-07-12 ENCOUNTER — ANESTHESIA (OUTPATIENT)
Dept: SURGERY | Facility: HOSPITAL | Age: 33
End: 2023-07-12
Payer: COMMERCIAL

## 2023-07-12 ENCOUNTER — ANESTHESIA EVENT (OUTPATIENT)
Dept: SURGERY | Facility: HOSPITAL | Age: 33
End: 2023-07-12
Payer: COMMERCIAL

## 2023-07-12 ENCOUNTER — HOSPITAL ENCOUNTER (OUTPATIENT)
Facility: HOSPITAL | Age: 33
Discharge: HOME | End: 2023-07-12
Attending: SURGERY | Admitting: SURGERY
Payer: COMMERCIAL

## 2023-07-12 VITALS
OXYGEN SATURATION: 98 % | SYSTOLIC BLOOD PRESSURE: 131 MMHG | WEIGHT: 125 LBS | BODY MASS INDEX: 20.83 KG/M2 | HEIGHT: 65 IN | RESPIRATION RATE: 15 BRPM | DIASTOLIC BLOOD PRESSURE: 84 MMHG | TEMPERATURE: 97 F | HEART RATE: 78 BPM

## 2023-07-12 DIAGNOSIS — K80.20 CALCULUS OF GALLBLADDER WITHOUT CHOLECYSTITIS WITHOUT OBSTRUCTION: Primary | ICD-10-CM

## 2023-07-12 DIAGNOSIS — K56.41: ICD-10-CM

## 2023-07-12 LAB — B-HCG UR QL: NEGATIVE

## 2023-07-12 PROCEDURE — 63600175 PHARM REV CODE 636 W HCPCS

## 2023-07-12 PROCEDURE — 71000033 HC RECOVERY, INTIAL HOUR: Performed by: SURGERY

## 2023-07-12 PROCEDURE — 47562 LAPAROSCOPIC CHOLECYSTECTOMY: CPT | Mod: ,,, | Performed by: SURGERY

## 2023-07-12 PROCEDURE — 25000003 PHARM REV CODE 250: Performed by: SURGERY

## 2023-07-12 PROCEDURE — 37000009 HC ANESTHESIA EA ADD 15 MINS: Performed by: SURGERY

## 2023-07-12 PROCEDURE — 88304 PR  SURG PATH,LEVEL III: ICD-10-PCS | Mod: 26,,, | Performed by: PATHOLOGY

## 2023-07-12 PROCEDURE — D9220A PRA ANESTHESIA: Mod: CRNA,,, | Performed by: NURSE ANESTHETIST, CERTIFIED REGISTERED

## 2023-07-12 PROCEDURE — 25000003 PHARM REV CODE 250: Performed by: NURSE ANESTHETIST, CERTIFIED REGISTERED

## 2023-07-12 PROCEDURE — 63600175 PHARM REV CODE 636 W HCPCS: Performed by: SURGERY

## 2023-07-12 PROCEDURE — 71000015 HC POSTOP RECOV 1ST HR: Performed by: SURGERY

## 2023-07-12 PROCEDURE — 37000008 HC ANESTHESIA 1ST 15 MINUTES: Performed by: SURGERY

## 2023-07-12 PROCEDURE — D9220A PRA ANESTHESIA: Mod: ANES,,, | Performed by: ANESTHESIOLOGY

## 2023-07-12 PROCEDURE — 63600175 PHARM REV CODE 636 W HCPCS: Performed by: NURSE ANESTHETIST, CERTIFIED REGISTERED

## 2023-07-12 PROCEDURE — 88304 TISSUE EXAM BY PATHOLOGIST: CPT | Mod: 59 | Performed by: PATHOLOGY

## 2023-07-12 PROCEDURE — 44970 PR LAP,APPENDECTOMY: ICD-10-PCS | Mod: 59,51,, | Performed by: SURGERY

## 2023-07-12 PROCEDURE — 63600175 PHARM REV CODE 636 W HCPCS: Performed by: ANESTHESIOLOGY

## 2023-07-12 PROCEDURE — 47562 PR LAP,CHOLECYSTECTOMY: ICD-10-PCS | Mod: ,,, | Performed by: SURGERY

## 2023-07-12 PROCEDURE — 88304 TISSUE EXAM BY PATHOLOGIST: CPT | Mod: 26,,, | Performed by: PATHOLOGY

## 2023-07-12 PROCEDURE — 36000709 HC OR TIME LEV III EA ADD 15 MIN: Performed by: SURGERY

## 2023-07-12 PROCEDURE — 71000016 HC POSTOP RECOV ADDL HR: Performed by: SURGERY

## 2023-07-12 PROCEDURE — D9220A PRA ANESTHESIA: ICD-10-PCS | Mod: ANES,,, | Performed by: ANESTHESIOLOGY

## 2023-07-12 PROCEDURE — 36000708 HC OR TIME LEV III 1ST 15 MIN: Performed by: SURGERY

## 2023-07-12 PROCEDURE — D9220A PRA ANESTHESIA: ICD-10-PCS | Mod: CRNA,,, | Performed by: NURSE ANESTHETIST, CERTIFIED REGISTERED

## 2023-07-12 PROCEDURE — 81025 URINE PREGNANCY TEST: CPT | Performed by: ANESTHESIOLOGY

## 2023-07-12 PROCEDURE — 44970 LAPAROSCOPY APPENDECTOMY: CPT | Mod: 59,51,, | Performed by: SURGERY

## 2023-07-12 PROCEDURE — 27201423 OPTIME MED/SURG SUP & DEVICES STERILE SUPPLY: Performed by: SURGERY

## 2023-07-12 PROCEDURE — 25000003 PHARM REV CODE 250

## 2023-07-12 RX ORDER — SODIUM CHLORIDE, SODIUM LACTATE, POTASSIUM CHLORIDE, CALCIUM CHLORIDE 600; 310; 30; 20 MG/100ML; MG/100ML; MG/100ML; MG/100ML
125 INJECTION, SOLUTION INTRAVENOUS CONTINUOUS
Status: DISCONTINUED | OUTPATIENT
Start: 2023-07-12 | End: 2023-07-12 | Stop reason: HOSPADM

## 2023-07-12 RX ORDER — LIDOCAINE HYDROCHLORIDE 10 MG/ML
1 INJECTION, SOLUTION EPIDURAL; INFILTRATION; INTRACAUDAL; PERINEURAL ONCE
Status: DISCONTINUED | OUTPATIENT
Start: 2023-07-12 | End: 2023-07-12 | Stop reason: HOSPADM

## 2023-07-12 RX ORDER — ONDANSETRON 2 MG/ML
4 INJECTION INTRAMUSCULAR; INTRAVENOUS DAILY PRN
Status: DISCONTINUED | OUTPATIENT
Start: 2023-07-12 | End: 2023-07-12 | Stop reason: HOSPADM

## 2023-07-12 RX ORDER — ONDANSETRON 4 MG/1
4 TABLET, FILM COATED ORAL EVERY 6 HOURS PRN
Qty: 30 TABLET | Refills: 0 | Status: SHIPPED | OUTPATIENT
Start: 2023-07-12 | End: 2023-07-22

## 2023-07-12 RX ORDER — SODIUM CHLORIDE, SODIUM LACTATE, POTASSIUM CHLORIDE, CALCIUM CHLORIDE 600; 310; 30; 20 MG/100ML; MG/100ML; MG/100ML; MG/100ML
INJECTION, SOLUTION INTRAVENOUS CONTINUOUS
Status: DISCONTINUED | OUTPATIENT
Start: 2023-07-12 | End: 2023-07-12 | Stop reason: HOSPADM

## 2023-07-12 RX ORDER — LIDOCAINE HYDROCHLORIDE 10 MG/ML
1 INJECTION, SOLUTION EPIDURAL; INFILTRATION; INTRACAUDAL; PERINEURAL ONCE
Status: CANCELLED | OUTPATIENT
Start: 2023-07-12 | End: 2023-07-12

## 2023-07-12 RX ORDER — LIDOCAINE HYDROCHLORIDE 20 MG/ML
INJECTION, SOLUTION EPIDURAL; INFILTRATION; INTRACAUDAL; PERINEURAL
Status: DISCONTINUED | OUTPATIENT
Start: 2023-07-12 | End: 2023-07-12

## 2023-07-12 RX ORDER — BUPIVACAINE HYDROCHLORIDE AND EPINEPHRINE 2.5; 5 MG/ML; UG/ML
INJECTION, SOLUTION EPIDURAL; INFILTRATION; INTRACAUDAL; PERINEURAL
Status: DISCONTINUED | OUTPATIENT
Start: 2023-07-12 | End: 2023-07-12 | Stop reason: HOSPADM

## 2023-07-12 RX ORDER — MORPHINE SULFATE 2 MG/ML
INJECTION, SOLUTION INTRAMUSCULAR; INTRAVENOUS
Status: DISCONTINUED | OUTPATIENT
Start: 2023-07-12 | End: 2023-07-12

## 2023-07-12 RX ORDER — MIDAZOLAM HYDROCHLORIDE 1 MG/ML
INJECTION, SOLUTION INTRAMUSCULAR; INTRAVENOUS
Status: DISCONTINUED | OUTPATIENT
Start: 2023-07-12 | End: 2023-07-12

## 2023-07-12 RX ORDER — SODIUM CHLORIDE 9 MG/ML
INJECTION, SOLUTION INTRAVENOUS CONTINUOUS
Status: DISCONTINUED | OUTPATIENT
Start: 2023-07-12 | End: 2023-07-12 | Stop reason: HOSPADM

## 2023-07-12 RX ORDER — MIDAZOLAM HYDROCHLORIDE 1 MG/ML
INJECTION INTRAMUSCULAR; INTRAVENOUS
Status: DISCONTINUED
Start: 2023-07-12 | End: 2023-07-12 | Stop reason: WASHOUT

## 2023-07-12 RX ORDER — PROPOFOL 10 MG/ML
VIAL (ML) INTRAVENOUS
Status: DISCONTINUED | OUTPATIENT
Start: 2023-07-12 | End: 2023-07-12

## 2023-07-12 RX ORDER — SODIUM CHLORIDE, SODIUM LACTATE, POTASSIUM CHLORIDE, CALCIUM CHLORIDE 600; 310; 30; 20 MG/100ML; MG/100ML; MG/100ML; MG/100ML
INJECTION, SOLUTION INTRAVENOUS CONTINUOUS
Status: CANCELLED | OUTPATIENT
Start: 2023-07-12

## 2023-07-12 RX ORDER — SCOLOPAMINE TRANSDERMAL SYSTEM 1 MG/1
PATCH, EXTENDED RELEASE TRANSDERMAL
Status: DISCONTINUED
Start: 2023-07-12 | End: 2023-07-12 | Stop reason: HOSPADM

## 2023-07-12 RX ORDER — MORPHINE SULFATE 2 MG/ML
2 INJECTION, SOLUTION INTRAMUSCULAR; INTRAVENOUS EVERY 5 MIN PRN
Status: DISCONTINUED | OUTPATIENT
Start: 2023-07-12 | End: 2023-07-12 | Stop reason: HOSPADM

## 2023-07-12 RX ORDER — SCOLOPAMINE TRANSDERMAL SYSTEM 1 MG/1
1 PATCH, EXTENDED RELEASE TRANSDERMAL
Status: DISCONTINUED | OUTPATIENT
Start: 2023-07-12 | End: 2023-07-12 | Stop reason: HOSPADM

## 2023-07-12 RX ORDER — OXYCODONE AND ACETAMINOPHEN 5; 325 MG/1; MG/1
1 TABLET ORAL
Status: DISCONTINUED | OUTPATIENT
Start: 2023-07-12 | End: 2023-07-12 | Stop reason: HOSPADM

## 2023-07-12 RX ORDER — KETOROLAC TROMETHAMINE 30 MG/ML
INJECTION, SOLUTION INTRAMUSCULAR; INTRAVENOUS
Status: DISCONTINUED | OUTPATIENT
Start: 2023-07-12 | End: 2023-07-12

## 2023-07-12 RX ORDER — ACETAMINOPHEN 10 MG/ML
INJECTION, SOLUTION INTRAVENOUS
Status: DISCONTINUED | OUTPATIENT
Start: 2023-07-12 | End: 2023-07-12

## 2023-07-12 RX ORDER — ONDANSETRON 2 MG/ML
INJECTION INTRAMUSCULAR; INTRAVENOUS
Status: COMPLETED
Start: 2023-07-12 | End: 2023-07-12

## 2023-07-12 RX ORDER — ONDANSETRON 2 MG/ML
INJECTION INTRAMUSCULAR; INTRAVENOUS
Status: DISCONTINUED | OUTPATIENT
Start: 2023-07-12 | End: 2023-07-12

## 2023-07-12 RX ORDER — OXYCODONE AND ACETAMINOPHEN 5; 325 MG/1; MG/1
1 TABLET ORAL EVERY 6 HOURS PRN
Qty: 30 TABLET | Refills: 0 | Status: SHIPPED | OUTPATIENT
Start: 2023-07-12 | End: 2023-07-22

## 2023-07-12 RX ORDER — DEXAMETHASONE SODIUM PHOSPHATE 4 MG/ML
INJECTION, SOLUTION INTRA-ARTICULAR; INTRALESIONAL; INTRAMUSCULAR; INTRAVENOUS; SOFT TISSUE
Status: DISCONTINUED | OUTPATIENT
Start: 2023-07-12 | End: 2023-07-12

## 2023-07-12 RX ORDER — KETOROLAC TROMETHAMINE 30 MG/ML
15 INJECTION, SOLUTION INTRAMUSCULAR; INTRAVENOUS ONCE
Status: DISCONTINUED | OUTPATIENT
Start: 2023-07-12 | End: 2023-07-12 | Stop reason: HOSPADM

## 2023-07-12 RX ORDER — ROCURONIUM BROMIDE 10 MG/ML
INJECTION, SOLUTION INTRAVENOUS
Status: DISCONTINUED | OUTPATIENT
Start: 2023-07-12 | End: 2023-07-12

## 2023-07-12 RX ADMIN — MORPHINE SULFATE 2 MG: 2 INJECTION, SOLUTION INTRAMUSCULAR; INTRAVENOUS at 10:07

## 2023-07-12 RX ADMIN — CEFOXITIN 2 G: 2 INJECTION, POWDER, FOR SOLUTION INTRAVENOUS at 09:07

## 2023-07-12 RX ADMIN — SUGAMMADEX 200 MG: 100 INJECTION, SOLUTION INTRAVENOUS at 09:07

## 2023-07-12 RX ADMIN — MORPHINE SULFATE 2 MG: 2 INJECTION, SOLUTION INTRAMUSCULAR; INTRAVENOUS at 09:07

## 2023-07-12 RX ADMIN — LIDOCAINE HYDROCHLORIDE 100 MG: 20 INJECTION, SOLUTION EPIDURAL; INFILTRATION; INTRACAUDAL at 09:07

## 2023-07-12 RX ADMIN — SCOPALAMINE 1 PATCH: 1 PATCH, EXTENDED RELEASE TRANSDERMAL at 08:07

## 2023-07-12 RX ADMIN — SODIUM CHLORIDE, SODIUM LACTATE, POTASSIUM CHLORIDE, AND CALCIUM CHLORIDE: .6; .31; .03; .02 INJECTION, SOLUTION INTRAVENOUS at 08:07

## 2023-07-12 RX ADMIN — KETOROLAC TROMETHAMINE 30 MG: 30 INJECTION, SOLUTION INTRAMUSCULAR; INTRAVENOUS at 09:07

## 2023-07-12 RX ADMIN — ONDANSETRON 4 MG: 2 INJECTION INTRAMUSCULAR; INTRAVENOUS at 09:07

## 2023-07-12 RX ADMIN — SCOLOPAMINE TRANSDERMAL SYSTEM 1 PATCH: 1 PATCH, EXTENDED RELEASE TRANSDERMAL at 08:07

## 2023-07-12 RX ADMIN — PROPOFOL 200 MG: 10 INJECTION, EMULSION INTRAVENOUS at 09:07

## 2023-07-12 RX ADMIN — ACETAMINOPHEN 1000 MG: 10 INJECTION INTRAVENOUS at 09:07

## 2023-07-12 RX ADMIN — SODIUM CHLORIDE, SODIUM LACTATE, POTASSIUM CHLORIDE, AND CALCIUM CHLORIDE 125 ML/HR: .6; .31; .03; .02 INJECTION, SOLUTION INTRAVENOUS at 10:07

## 2023-07-12 RX ADMIN — ONDANSETRON 4 MG: 2 INJECTION INTRAMUSCULAR; INTRAVENOUS at 08:07

## 2023-07-12 RX ADMIN — ROCURONIUM BROMIDE 30 MG: 10 INJECTION, SOLUTION INTRAVENOUS at 09:07

## 2023-07-12 RX ADMIN — MIDAZOLAM HYDROCHLORIDE 2 MG: 1 INJECTION, SOLUTION INTRAMUSCULAR; INTRAVENOUS at 09:07

## 2023-07-12 RX ADMIN — DEXAMETHASONE SODIUM PHOSPHATE 4 MG: 4 INJECTION, SOLUTION INTRA-ARTICULAR; INTRALESIONAL; INTRAMUSCULAR; INTRAVENOUS; SOFT TISSUE at 09:07

## 2023-07-12 NOTE — OP NOTE
[facility] Surgery  Operative Note     SUMMARY     Surgery Date: 7/12/2023     Pre-op Diagnosis:  Calculus of gallbladder without cholecystitis without obstruction [K80.20]  Fecalith [K56.41]    Post-op Diagnosis:  Post-Op Diagnosis Codes:     * Calculus of gallbladder without cholecystitis without obstruction [K80.20]     * Fecalith [K56.41]    Procedure(s) (LRB):  CHOLECYSTECTOMY-LAPAROSCOPIC (N/A)  APPENDECTOMY, LAPAROSCOPIC (N/A)    Surgeon(s) and Role:     * Brodie Garcia MD - Primary    Assistant:  None.    Antibiotics:  Mefoxin 2 g IV.     Estimated Blood Loss: 5 mL    Anesthesia:  General    Description of the findings of the procedure:  Chronic cholecystitis.  Critical view of safety was achieved ensured.  Chronic appendicitis.    Specimens:  Gallbladder.  Appendix.    Complications:  None apparent in the operative room.    Implants:  None.         Indications for Procedure:     Ms Bedoya is a 31yo F presented clinic with abdominal symptomatology.  Workup revealed evidence of gallstones as well as fecalith and chronic inflammation of the appendix.  Patient was offered surgical cholecystectomy as well as appendectomy.  Risk benefits of both options were discussed in detail with patient clinic.  She voiced understanding risk benefits wished to proceed with both appendectomy and cholecystectomy.    Procedure:     PROCEDURE IN DETAIL:  The patient was brought back into the Operative Room,  placed on table in supine position.  General anesthesia was introduced via  the endotracheal tube.  A timeout was then conducted with all in the  Operating Room in agreement, correct patient, correct procedure, correct  allergies and correct antibiotics.  The patient was then given 2g mefoxin  IV.  The patient's abdomen was then prepped and draped in the standard  sterile surgical fashion.    I then made a periumbilical incision.   Incision was carried down to the fascia with Bovie  electrocautery.  Towel clips were  used to elevate the umbilical stalk.  The  fascia was entered in a vertical fashion, two 0 Vicryl sutures were used  inferior and superior to the fascial incision sites as stay sutures.   Jorge trocar was placed after a finger sweep was conducted, which  confirmed no adhesions to the abdominal wall.  The abdomen was then  insufflated to 15 mmHg through the Jorge trocar without evidence of  bradycardia episodes of the patient.  The patient was then placed in a head  up, left lateral position.    I then placed three additional trocars in the patient's right upper  quadrant under direct visualization.  These were 5-mm trocars.  I then  placed the assistant's port on the dome of the gallbladder and retracted it  superiorly.  The gallbladder showed signs of chronic cholecystitis.  At this point, I  then placed my retractor on the infundibulum of the gallbladder and  retracted it inferolaterally.  The cystic duct structures were then bluntly  dissected out with the [place] retractor.  Cystic duct and cystic artery  were visualized.  Cystic artery was behind the node of Calot.  The cystic  duct and cystic artery were cleaned off and a critical view of safety was  achieved with the liver seen behind both structures and in between both  structures.  Two clips were placed proximally on both structures, one clip  distally.  Both structures were transected distally.  The gallbladder was  then retracted with adequate tension to allow for the Bovie electrocautery  to remove the gallbladder from the liver bed. The gallbladder was not entered.  Bile was not spilled.  The gallbladder was then dissected off, bovied off  the liver bed, it was placed in an EndoCatch bag.  EndoCatch bag was then  removed through the Jorge trocar. The gallbladder was handed off for permanent  section.  The liver was then retracted superiorly with a grasper.  The  gallbladder bed was washed out copiously with  irrigant.  Hemostasis was  achieved in  the gallbladder bed.  The clips placed previously on cystic  duct and cystic artery were visualized again.  There was no hemorrhage from  the artery.  There was no bile spillage from the cystic duct.  The patient  was then placed in a back to normal position, in the supine position.  The  right upper quadrant was irrigated out copiously with  irrigant until  clear fluid returned.    Attention was then turned towards the appendix in the right lower quadrant.  There was evidence of chronic inflammation and chronic appendicitis.  The mesoappendix was grasped.  Window was created between the mesoappendix and the appendix.  Mesoappendix was resected with a LigaSure device.  Appendix was resected off the cecal base with a JOY purple load stapler, 30 mm.  No bleeding from appendiceal base.    Three 5-mm trocars were removed under direct  visualization.  There was no hemorrhage from the port sites.  Insufflation  was released.  A Jorge trocar was then removed.    Attention was then turned towards repair of the umbilical Jorge port site.   A 0 Vicryl sutures were used in a figure-of-eight fashion to close the  fascia at the Jorge port in a vertical fashion.  These were tied down.   The two stay sutures previously placed were also tied down.  The fascia at  this site was not patent to a finger.  At this point, the umbilical site  was washed out with  irrigant.  3-0 Vicryl sutures were then used in a  simple subdermal fashion to close the subdermis at all sites.  The skin was  then run with a 4-0 Vicryl suture in a running subcuticular fashion at the  umbilical site.  Dermabond was placed over all four dressing.  The patient  was then reversed from general anesthesia, extubated in the OR, transferred  back to the Postoperative Care Unit in stable condition.  All counts were  correct at the end of the procedure including lap pads, instruments as well  as needles.

## 2023-07-12 NOTE — H&P
[facility] Surgery H&P Note    Subjective:       Patient ID: Shagufta Bedoya is a 32 y.o. female.    Chief Complaint:  Abdominal symptomatology.  Abnormal ultrasound.  Abnormal CT scan.  Abnormal lab studies.  HPI:  Shagufta Bedoya is a 32 y.o. female history of ADHD anemia anxiety fibromyalgia presents today as a new patient referral from Dr. Suárez for evaluation of abdominal symptomatology, abnormal ultrasound, abnormal CT scan and abnormal lab studies.  Patient states that last few weeks she is been having abdominal pain.  Right-sided.  Radiating to the back.  Between the shoulder blades.  Associated with nausea no vomiting.  Bloating present.  Patient underwent ultrasound gallbladder further evaluation which showed evidence of gallstones.  Concern for liver mass.  Patient underwent CT scan for evaluation as well as routine labs with CT scan.  CT scan confirmed no evidence of liver mass.  Gallstones confirmed.  Additionally there was evidence of appendiceal stranding inflammation as well as fecalith.  Further with the lab studies there is found to be evidence of low iron level, and significant anemia with a hemoglobin just over 7.  Patient presents today surgery Clinic for evaluation.    Past Medical History:   Diagnosis Date    ADHD (attention deficit hyperactivity disorder)     Anemia     Anxiety     Anxiety state 5/13/2021 10:50:43 AM    [facility] Historical - Quick Add: Anxiety and depression-No Additional Notes    Attention deficit disorder 5/13/2021 11:14:06 AM    [facility] Historical - Other: ADD (attention deficit disorder)-No Additional Notes    Esophageal reflux 5/13/2021 11:14:13 AM    [facility] Historical - Unknown: Acid reflux disease-No Additional Notes    Fibromyalgia 2015    Fibromyalgia      Past Surgical History:   Procedure Laterality Date    AUGMENTATION OF BREAST  10/07/2022    COLONOSCOPY      COLONOSCOPY N/A 7/5/2023    Procedure: COLONOSCOPY;  Surgeon: Mirian Alford MD;   Location: [facility];  Service: Endoscopy;  Laterality: N/A;    UPPER GASTROINTESTINAL ENDOSCOPY      WISDOM TOOTH EXTRACTION       Family History   Problem Relation Age of Onset    Colon cancer Mother 76    Cancer Mother         cervical     Cancer Sister         cervical    Colon polyps Neg Hx     Esophageal cancer Neg Hx     Stomach cancer Neg Hx      Social History     Socioeconomic History    Marital status:    Tobacco Use    Smoking status: Never    Smokeless tobacco: Never   Substance and Sexual Activity    Alcohol use: Yes     Comment: occasional     Drug use: No    Sexual activity: Yes     Birth control/protection: Implant       Current Facility-Administered Medications   Medication Dose Route Frequency Provider Last Rate Last Admin    0.9%  NaCl infusion   Intravenous Continuous Brodie Garcia MD        ceFOXItin (MEFOXIN) 2 g in dextrose 5 % in water (D5W) 5 % 100 mL IVPB (MB+)  2 g Intravenous Once Brodie Garcia MD         Review of patient's allergies indicates:   Allergen Reactions    Sulfa (sulfonamide antibiotics) Hives       Review of Systems   Constitutional:  Negative for activity change, appetite change, chills and fever.   HENT:  Negative for congestion, dental problem and ear discharge.    Eyes:  Negative for discharge and itching.   Respiratory:  Negative for apnea, choking and chest tightness.    Cardiovascular:  Negative for chest pain and leg swelling.   Gastrointestinal:  Positive for abdominal pain and nausea. Negative for abdominal distention, anal bleeding, constipation and diarrhea.   Endocrine: Negative for cold intolerance and heat intolerance.   Genitourinary:  Negative for difficulty urinating and dyspareunia.   Musculoskeletal:  Negative for arthralgias and back pain.   Skin:  Negative for color change and pallor.   Neurological:  Negative for dizziness and facial asymmetry.   Hematological:  Negative for adenopathy. Does not bruise/bleed easily.    Psychiatric/Behavioral:  Negative for agitation and behavioral problems.      Objective:      There were no vitals filed for this visit.    Physical Exam  Constitutional:       General: She is not in acute distress.     Appearance: She is well-developed.   HENT:      Head: Normocephalic and atraumatic.   Eyes:      Pupils: Pupils are equal, round, and reactive to light.   Neck:      Thyroid: No thyromegaly.   Cardiovascular:      Rate and Rhythm: Normal rate and regular rhythm.   Pulmonary:      Effort: Pulmonary effort is normal.      Breath sounds: Normal breath sounds.   Abdominal:      General: Bowel sounds are normal. There is no distension.      Palpations: Abdomen is soft.      Tenderness: There is abdominal tenderness in the right upper quadrant, right lower quadrant and epigastric area.   Musculoskeletal:         General: No deformity. Normal range of motion.      Cervical back: Normal range of motion and neck supple.   Skin:     General: Skin is warm.      Capillary Refill: Capillary refill takes less than 2 seconds.      Findings: No erythema.   Neurological:      Mental Status: She is alert and oriented to person, place, and time.      Cranial Nerves: No cranial nerve deficit.   Psychiatric:         Behavior: Behavior normal.       Lab Review: CBC:   Lab Results   Component Value Date    WBC 10.44 07/06/2023    RBC 4.64 07/06/2023    HGB 12.0 07/06/2023    HCT 37.3 07/06/2023     07/06/2023     BMP:   Lab Results   Component Value Date    GLU 81 05/09/2023     05/09/2023    K 3.6 05/09/2023     05/09/2023    CO2 22 (L) 05/09/2023    BUN 6 05/09/2023    CREATININE 0.7 05/09/2023    CALCIUM 9.0 05/09/2023     Diagnostics Review: US: Reviewed  CT: Reviewed  Ultrasound CT scan reviewed.  Cholelithiasis.  Fecalith with the appendiceal stranding.     Assessment:       1. Fecalith    2. Calculus of gallbladder without cholecystitis without obstruction        Plan:   Fecalith  -     Case  Request Operating Room: CHOLECYSTECTOMY-LAPAROSCOPIC, APPENDECTOMY, LAPAROSCOPIC  -     Place in Outpatient; Standing  -     Vital signs; Standing  -     Verify beta-blocker dose taken within 24 hours if patient is prescribed beta-blocker; Standing  -     Height and weight; Standing  -     Intake and output; Standing  -     Verify discontinuation of antithrombotics; Standing  -     POCT glucose; Standing  -     Verify blood consent; Standing  -     Verify consent; Standing  -     Clip and Prep Abdomen Zyphoid to Pubis; Standing  -     Chlorhexidine (CHG) 2% Wipes; Standing  -     Hibiclens shower; Standing  -     Hibiclens shower; Standing  -     Notify Physician; Standing  -     Diet NPO; Standing  -     Place JOHNNA hose; Standing  -     Place sequential compression device; Standing    Calculus of gallbladder without cholecystitis without obstruction  -     Case Request Operating Room: CHOLECYSTECTOMY-LAPAROSCOPIC, APPENDECTOMY, LAPAROSCOPIC  -     Place in Outpatient; Standing  -     Vital signs; Standing  -     Verify beta-blocker dose taken within 24 hours if patient is prescribed beta-blocker; Standing  -     Height and weight; Standing  -     Intake and output; Standing  -     Verify discontinuation of antithrombotics; Standing  -     POCT glucose; Standing  -     Verify blood consent; Standing  -     Verify consent; Standing  -     Clip and Prep Abdomen Zyphoid to Pubis; Standing  -     Chlorhexidine (CHG) 2% Wipes; Standing  -     Hibiclens shower; Standing  -     Hibiclens shower; Standing  -     Notify Physician; Standing  -     Diet NPO; Standing  -     Place JOHNNA hose; Standing  -     Place sequential compression device; Standing    Other orders  -     Pregnancy, urine rapid; Standing  -     0.9%  NaCl infusion  -     IP VTE LOW RISK PATIENT; Standing  -     ceFOXItin (MEFOXIN) 2 g in dextrose 5 % in water (D5W) 5 % 100 mL IVPB (MB+)        Medical Decision Making/Counseling:  Multiple medical issues.   Complex patient.      Most prevailing issue at this point is the severe anemia with low ferritin level hemoglobin just over 7 with a history of anemia.  Likely chronic blood loss anemia.  Recommendation will be Venofer infusions 200 mg weekly given the severe anemia and need for pending surgery for other medical conditions which can not weight.  With delaying infusions of iron, we could delay surgery possibly necessitating the patient to go through the ER if emergent issues were to happen including cholecystitis gallstone pancreatitis liver dysfunction or acute appendicitis which could need emergent surgical intervention possible blood transfusion.  Recommendation will be for Venofer infusions 200 mg IV weekly for the next 6-8 weeks repeat iron studies and CBC after that.  If both are showing significant improvement and getting the patient out of the danger zone for needing blood transfusion will proceed with surgical intervention at that time.      Will recommend surgical appendectomy as well as surgical cholecystectomy.  Gallstones present.  With symptoms.  Additionally right-sided abdominal pain with fecalith and appendiceal stranding.  Patient is pending the need for both surgical interventions possibly emergently if not taken care of in the next few months.  Risk benefits as well as alternatives were discussed in detail with the patient in clinic today.  After risk benefits discussion, patient voiced understanding risk benefits and desires to proceed with both surgical interventions.  All questions were answered to patient's satisfaction.      Risks and benefits of cholecystectomy were discussed with the patient. Benefits the patient could receive would be the resolution of gallbladder attacks causing pain, cramps, nausea vomiting etc.  Benefits also include eliminating the risk of the patient presenting through the ER with acute cholecystitis and needing an emergent operation.  Risks of cholecystectomy were  also included in our discussion.  Risks include injury to the common bile duct (liver drain tube) occurring in 1 in 250 cholecystectomy is performed across the [place].  I discussed with the patient that if this were to occur she could need further surgeries to include likely a hepaticojejunostomy.  I discussed there is a possibility of transfer for surgical therapy for this, if it were to occur.  I also discussed the risk of conversion to an open procedure (cold fashion surgery, how gallbladders were taken out previously) to occur in 5% of cholecystectomies.  I discussed that in 100% cholecystectomies I started with the small incision (laparoscopic technique).  Reason for conversion to an open procedure is related to bleeding, significant scarring or inflammation, inability to obtain a critical view which could lead to injury of surrounding structures to include the stomach, duodenum, IVC, common bile duct, etc.  I discussed there is also a possibility of postoperatively needing reoperation.  Additionally, I have discussed with the patient the possibilities of biliary leak after laparoscopic cholecystectomy.  Literature would suggest a 2% leak rate.  This could necessitate a postoperative ERCP or even postoperative procedure including diagnostic laparoscopy for drainage or percutaneous drainage.  There is also the intrinsic risks of any surgery to include bleeding and infection.  Patient understands all the above risks and benefits and wishes to proceed in the near future with laparoscopic versus open cholecystectomy.  Counseling time 60 minutes in clinic.  I drew a picture for the patient of the foregut liver biliary anatomy and tree for further understanding while in clinic.    Patient instructed that best way to communicate with my office staff is for patient to get on the [facility] patient portal to expedite communication and communication issues that may occur.  Patient was given instructions on how  to get on the portal.  I encouraged patient to obtain portal access as well.  Ultimately it is up to the patient to obtain access.  Patient voiced understanding.

## 2023-07-12 NOTE — PLAN OF CARE
Has met unit/department guidelines for discharge from each phase of the post procedure continuum. Leaving floor per w/c with RN. JANET x3. Resp even and unlabored room air. No distress noted. C/o nausea, no emesis. Cool rag and emesis basin provided. Copy of dc paperwork and RX x2 with pts . All personal belongings returned to pt.

## 2023-07-12 NOTE — DISCHARGE INSTRUCTIONS
[facility]   [phone]  [facility]      [phone]    Managing nausea    Some people have an upset stomach after surgery. This is often because of anesthesia, pain, or pain medicine, or the stress of surgery. These tips will help you handle nausea and eat healthy foods as you get better. If you were on a special food plan before surgery, ask your healthcare provider if you should follow it while you get better. These tips may help:  Do not push yourself to eat. Your body will tell you when to eat and how much.  Start off with clear liquids and soup. They are easier to digest.  Next try semi-solid foods, such as mashed potatoes, applesauce, and gelatin, as you feel ready.  Slowly move to solid foods. Dont eat fatty, rich, or spicy foods at first.  Do not force yourself to have 3 large meals a day. Instead eat smaller amounts more often.  Take pain medicines with a small amount of solid food, such as crackers or toast, to avoid nausea.      DERMABOND ADVANCED   Adhesive is a sterile, liquid skin adhesive that holds wound edges together. The film will usually remain in place for 5 to 10 days, then naturally fall of your skin. The following will answer some of your questions and provide instructions for proper care for your wound while it is healing.    Check Wound Appearance   Some swelling, redness, and pain are common with all wounds and normally will go away as the wound heals. If swelling, redness, or pain increases, or if the wound feels warm to the touch, contact a doctor. Also contact a doctor if the wound edges reopen or separate.    Caring for Bandages   Do not place tape directly over the DERMABOND adhesive, because removing the tape later may also remove the film.    Avoid Topical Medications   Do not apply liquid or ointment medications or any other product to your wound while the DERMABOND Adhesive is in place. These may loosen the film before your wound is  healed.    Keep Wound Dry and Protected   Apply a clean, dry bandage over the wound if necessary to protect it.   You may occasionally and briely wet your wound in the shower or bath. Do not soak or scrub your wound, do not swim, and avoid periods of heavy perspiration until the DERMABOND Adhesive has naturally fallen off.   After showering or bathing, gently blot your wound dry with a soft towel. If a protective dressing is being used, apply a fresh, dry bandage, keeping the tape off the DERMABOND Adhesive.   Protect your wound from injury until the skin has had sufficient time to heal.   Do not scratch, rub, or pick at the DERMABOND Adhesive. This may loosen the film before your wound is healed.   Protect the wound from prolonged exposure to sunlight or tanning lamps while the film is in place.     The patch behind your right ear can be removed on Saturday, July 15, 2023.

## 2023-07-12 NOTE — PLAN OF CARE
20G to right hand. LR infusing via gravity at KVO. No noted problems to site. Dsg clean, dry and intact.

## 2023-07-12 NOTE — ANESTHESIA PREPROCEDURE EVALUATION
07/12/2023  Shagufta Bedoya is a 32 y.o., female.      Pre-op Assessment    I have reviewed the Patient Summary Reports.     I have reviewed the Nursing Notes.    I have reviewed the Medications.     Review of Systems  Anesthesia Hx:  No problems with previous Anesthesia  Neg history of prior surgery. Denies Family Hx of Anesthesia complications.   Denies Personal Hx of Anesthesia complications.   Social:  Non-Smoker    Hematology/Oncology:  Hematology Normal   Oncology Normal     EENT/Dental:EENT/Dental Normal   Cardiovascular:  Cardiovascular Normal     Pulmonary:  Pulmonary Normal    Renal/:  Renal/ Normal     Hepatic/GI:   GERD, well controlled    Musculoskeletal:  Musculoskeletal Normal    Neurological:   Neuromuscular Disease, Headaches    Endocrine:  Endocrine Normal    Dermatological:  Skin Normal    Psych:   Psychiatric History          Physical Exam  General: Alert    Airway:  Mallampati: II   Mouth Opening: Normal  TM Distance: Normal  Neck ROM: Normal ROM    Dental:  Intact    Chest/Lungs:  Clear to auscultation, Normal Respiratory Rate    Heart:  Rate: Normal    Abdomen:  Normal        Anesthesia Plan  Type of Anesthesia, risks & benefits discussed:    Anesthesia Type: Gen ETT  Post Op Pain Control Plan: multimodal analgesia  Airway Plan: Direct, Post-Induction  Informed Consent: Informed consent signed with the Patient and all parties understand the risks and agree with anesthesia plan.  All questions answered. Patient consented to blood products? No  ASA Score: 2  Day of Surgery Review of History & Physical: H&P Update referred to the surgeon/provider.    Ready For Surgery From Anesthesia Perspective.     .

## 2023-07-12 NOTE — ANESTHESIA POSTPROCEDURE EVALUATION
Anesthesia Post Evaluation    Patient: Shagufta Bedoya    Procedure(s) Performed: Procedure(s) (LRB):  CHOLECYSTECTOMY-LAPAROSCOPIC (N/A)  APPENDECTOMY, LAPAROSCOPIC (N/A)    Final Anesthesia Type: general      Patient location during evaluation: PACU  Patient participation: Yes- Able to Participate  Level of consciousness: awake and awake and alert  Post-procedure vital signs: reviewed and stable  Pain management: adequate  Airway patency: patent    PONV status at discharge: No PONV  Anesthetic complications: no      Cardiovascular status: blood pressure returned to baseline  Respiratory status: unassisted and spontaneous ventilation  Hydration status: euvolemic  Follow-up not needed.          Vitals Value Taken Time   /63 07/12/23 1147   Temp 36.2 °C (97.1 °F) 07/12/23 1010   Pulse 84 07/12/23 1154   Resp 17 07/12/23 1154   SpO2 98 % 07/12/23 1156   Vitals shown include unvalidated device data.      Event Time   Out of Recovery 10:55:00         Pain/Owen Score: Pain Rating Prior to Med Admin: 8 (7/12/2023 10:53 AM)  Pain Rating Post Med Admin: 3 (7/12/2023 11:00 AM)  Owen Score: 9 (7/12/2023 10:55 AM)  Modified Owen Score: 20 (7/12/2023 12:14 PM)

## 2023-07-12 NOTE — ANESTHESIA PROCEDURE NOTES
Intubation    Date/Time: 7/12/2023 9:13 AM  Performed by: Nilay Welch CRNA  Authorized by: Juan Munroe MD     Intubation:     Induction:  Intravenous    Intubated:  Postinduction    Mask Ventilation:  Easy mask    Attempts:  1    Attempted By:  CRNA    Method of Intubation:  Video laryngoscopy    Blade:  Oliveira 3    Laryngeal View Grade: Grade I - full view of cords      Difficult Airway Encountered?: No      Complications:  None    Airway Device:  Oral endotracheal tube    Airway Device Size:  7.0    Tube secured:  22    Secured at:  The teeth    Placement Verified By:  Capnometry    Complicating Factors:  None    Findings Post-Intubation:  BS equal bilateral

## 2023-07-12 NOTE — PLAN OF CARE
20G RH iv dc'd with tip intact. No noted problems to site. Pressure dsg applied to site. Tolerated well.

## 2023-07-12 NOTE — TRANSFER OF CARE
"Anesthesia Transfer of Care Note    Patient: Shagufta Bedoya    Procedure(s) Performed: Procedure(s) (LRB):  CHOLECYSTECTOMY-LAPAROSCOPIC (N/A)  APPENDECTOMY, LAPAROSCOPIC (N/A)    Patient location: PACU    Anesthesia Type: general    Transport from OR: Transported from OR on room air with adequate spontaneous ventilation    Post pain: adequate analgesia    Post assessment: no apparent anesthetic complications and tolerated procedure well    Post vital signs: stable    Level of consciousness: awake, alert and oriented    Nausea/Vomiting: no nausea/vomiting    Complications: none    Transfer of care protocol was followed      Last vitals:   Visit Vitals  /87   Pulse 84   Temp 36.7 °C (98 °F)   Resp 18   Ht 5' 5" (1.651 m)   Wt 56.7 kg (125 lb)   SpO2 99%   Breastfeeding No   BMI 20.80 kg/m²     "

## 2023-07-12 NOTE — DISCHARGE SUMMARY
Discharge Note        SUMMARY     Admit Date: 7/12/2023    Attending Physician: Brodie Garcia MD     Discharge Physician: Brodie Garcia MD    Discharge Date: 7/12/2023 10:02 AM      Hospital Course: Patient tolerated procedure well.     Disposition: Home or Self Care    Patient Instructions:   Current Discharge Medication List        START taking these medications    Details   ondansetron (ZOFRAN) 4 MG tablet Take 1 tablet (4 mg total) by mouth every 6 (six) hours as needed for Nausea.  Qty: 30 tablet, Refills: 0      oxyCODONE-acetaminophen (PERCOCET) 5-325 mg per tablet Take 1 tablet by mouth every 6 (six) hours as needed for Pain.  Qty: 30 tablet, Refills: 0    Comments: n/a            CONTINUE these medications which have NOT CHANGED    Details   amitriptyline (ELAVIL) 10 MG tablet Take 2 tablets (20 mg total) by mouth nightly as needed for Insomnia.  Qty: 180 tablet, Refills: 2      L norgest/e.estradioL-e.estrad (SEASONIQUE) 0.15 mg-30 mcg (84)/10 mcg (7) 3MPk Take 1 tablet by mouth once daily.  Qty: 91 each, Refills: 0    Associated Diagnoses: Menorrhagia with regular cycle      linaclotide (LINZESS ORAL) Take by mouth.      methylphenidate HCl (RITALIN LA) 10 MG 24 hr capsule Take 1 capsule (10 mg total) by mouth every morning.  Qty: 30 capsule, Refills: 0    Associated Diagnoses: Adult ADHD      busPIRone (BUSPAR) 5 MG Tab Take 1 tablet (5 mg total) by mouth 2 (two) times daily.  Qty: 60 tablet, Refills: 11    Associated Diagnoses: FAUSTO (generalized anxiety disorder)      drospirenone-ethinyl estradioL (YUSRA) 3-0.02 mg per tablet Take 1 tablet by mouth once daily.  Qty: 30 tablet, Refills: 11    Associated Diagnoses: Family planning      ketoconazole (NIZORAL) 2 % shampoo Apply topically twice a week.  Qty: 120 mL, Refills: 11      ondansetron (ZOFRAN-ODT) 4 MG TbDL DISSOLVE 1 TABLET(4 MG) ON THE TONGUE EVERY 6 HOURS AS NEEDED FOR NAUSEA  Qty: 30 tablet, Refills: 0    Associated Diagnoses:  Nausea      pantoprazole (PROTONIX) 40 MG tablet Take 1 tablet (40 mg total) by mouth once daily.  Qty: 30 tablet, Refills: 2    Associated Diagnoses: Pain of upper abdomen; History of gastritis             Discharge Procedure Orders (must include Diet, Follow-up, Activity):   Discharge Procedure Orders (must include Diet, Follow-up, Activity)   Diet general     Lifting restrictions   Order Comments: No heavy lifting, pushing, pulling, bending, strenuous exercise greater than 10 lb for the next 6 weeks.     Other restrictions (specify):   Order Comments: No swimming or submersion of wounds in lakes, ponds, streams, oceans, bathtubs, etc until seen in clinic for postoperative evaluation.     No dressing needed     Call MD for:  temperature >100.4     Call MD for:  persistent nausea and vomiting     Call MD for:  severe uncontrolled pain     Call MD for:  difficulty breathing, headache or visual disturbances     Call MD for:  redness, tenderness, or signs of infection (pain, swelling, redness, odor or green/yellow discharge around incision site)     Call MD for:  persistent dizziness or light-headedness        Follow Up:  Follow up as scheduled.  Resume routine diet.  Activity as tolerated.    No driving day of procedure.

## 2023-07-21 DIAGNOSIS — N92.0 MENORRHAGIA WITH REGULAR CYCLE: ICD-10-CM

## 2023-07-23 ENCOUNTER — PATIENT MESSAGE (OUTPATIENT)
Dept: SURGERY | Facility: CLINIC | Age: 33
End: 2023-07-23
Payer: COMMERCIAL

## 2023-07-24 RX ORDER — LEVONORGESTREL AND ETHINYL ESTRADIOL AND ETHINYL ESTRADIOL 150-30(84)
KIT ORAL
Qty: 91 EACH | Refills: 3 | Status: SHIPPED | OUTPATIENT
Start: 2023-07-24

## 2023-07-24 NOTE — TELEPHONE ENCOUNTER
Writer called and spoke to pt, pt stated she was doing much better today, none of the previous symptoms over the weekend showing today. Pt scheduled for follow up with Dr. Garcia 07/25/23, date/ time gone over with pt to discuss symptoms and have post op check.  Pt expressed understanding.

## 2023-07-25 ENCOUNTER — LAB VISIT (OUTPATIENT)
Dept: LAB | Facility: HOSPITAL | Age: 33
End: 2023-07-25
Attending: SURGERY
Payer: COMMERCIAL

## 2023-07-25 ENCOUNTER — PATIENT MESSAGE (OUTPATIENT)
Dept: SURGERY | Facility: CLINIC | Age: 33
End: 2023-07-25

## 2023-07-25 ENCOUNTER — OFFICE VISIT (OUTPATIENT)
Dept: SURGERY | Facility: CLINIC | Age: 33
End: 2023-07-25
Payer: COMMERCIAL

## 2023-07-25 DIAGNOSIS — R53.1 WEAKNESS: ICD-10-CM

## 2023-07-25 DIAGNOSIS — K58.9 IRRITABLE BOWEL SYNDROME, UNSPECIFIED TYPE: ICD-10-CM

## 2023-07-25 DIAGNOSIS — E86.0 DEHYDRATION: Primary | ICD-10-CM

## 2023-07-25 DIAGNOSIS — E86.0 DEHYDRATION: ICD-10-CM

## 2023-07-25 LAB
BASOPHILS # BLD AUTO: 0.06 K/UL (ref 0–0.2)
BASOPHILS NFR BLD: 1 % (ref 0–1.9)
DIFFERENTIAL METHOD: ABNORMAL
EOSINOPHIL # BLD AUTO: 0.1 K/UL (ref 0–0.5)
EOSINOPHIL NFR BLD: 0.8 % (ref 0–8)
ERYTHROCYTE [DISTWIDTH] IN BLOOD BY AUTOMATED COUNT: 20.9 % (ref 11.5–14.5)
FINAL PATHOLOGIC DIAGNOSIS: NORMAL
GROSS: NORMAL
HCT VFR BLD AUTO: 37.8 % (ref 37–48.5)
HGB BLD-MCNC: 12.2 G/DL (ref 12–16)
IMM GRANULOCYTES # BLD AUTO: 0.02 K/UL (ref 0–0.04)
IMM GRANULOCYTES NFR BLD AUTO: 0.3 % (ref 0–0.5)
LYMPHOCYTES # BLD AUTO: 1.9 K/UL (ref 1–4.8)
LYMPHOCYTES NFR BLD: 30 % (ref 18–48)
Lab: NORMAL
MCH RBC QN AUTO: 26.6 PG (ref 27–31)
MCHC RBC AUTO-ENTMCNC: 32.3 G/DL (ref 32–36)
MCV RBC AUTO: 82 FL (ref 82–98)
MONOCYTES # BLD AUTO: 0.3 K/UL (ref 0.3–1)
MONOCYTES NFR BLD: 4.1 % (ref 4–15)
NEUTROPHILS # BLD AUTO: 4 K/UL (ref 1.8–7.7)
NEUTROPHILS NFR BLD: 63.8 % (ref 38–73)
NRBC BLD-RTO: 0 /100 WBC
PLATELET # BLD AUTO: 209 K/UL (ref 150–450)
PMV BLD AUTO: 9.1 FL (ref 9.2–12.9)
RBC # BLD AUTO: 4.59 M/UL (ref 4–5.4)
WBC # BLD AUTO: 6.3 K/UL (ref 3.9–12.7)

## 2023-07-25 PROCEDURE — 99999 PR PBB SHADOW E&M-EST. PATIENT-LVL I: ICD-10-PCS | Mod: PBBFAC,,, | Performed by: SURGERY

## 2023-07-25 PROCEDURE — 36415 COLL VENOUS BLD VENIPUNCTURE: CPT | Performed by: SURGERY

## 2023-07-25 PROCEDURE — 99024 POSTOP FOLLOW-UP VISIT: CPT | Mod: S$GLB,,, | Performed by: SURGERY

## 2023-07-25 PROCEDURE — 99999 PR PBB SHADOW E&M-EST. PATIENT-LVL I: CPT | Mod: PBBFAC,,, | Performed by: SURGERY

## 2023-07-25 PROCEDURE — 85025 COMPLETE CBC W/AUTO DIFF WBC: CPT | Performed by: SURGERY

## 2023-07-25 PROCEDURE — 99024 PR POST-OP FOLLOW-UP VISIT: ICD-10-PCS | Mod: S$GLB,,, | Performed by: SURGERY

## 2023-07-25 RX ORDER — HYOSCYAMINE SULFATE 0.125 MG
125 TABLET ORAL EVERY 4 HOURS PRN
Qty: 120 TABLET | Refills: 1 | Status: SHIPPED | OUTPATIENT
Start: 2023-07-25 | End: 2023-10-30

## 2023-07-25 NOTE — LETTER
July 25, 2023      [facility] - General Surgery  [address] [place] [address]  Phone: [phone]  Fax: [phone]       Patient: Shagufta Bedoya   YOB: 1990  Date of Visit: 07/25/2023    To Whom It May Concern:    Edwin Bedoya  was at [facility] on 07/25/2023. She may return to work on Intermittent leave with a reduced work schedule remote at home 07/25/23 thru 08/09/23 up to 3 days a week, up to 4 hours per day. If you have any questions or concerns, or if I can be of further assistance, please do not hesitate to contact me.    Sincerely,        Dayanna Pond LPN / Brodie Garcia MD

## 2023-07-25 NOTE — PROGRESS NOTES
[facility] Surgery  Follow-up    Subjective:     Chief Complaint: f/u gb and appy combo.    HPI:  Shagufta Bedoya is a 32 y.o. female presents today for follow-up evaluation after gbv and appendix combo.  Path pending.  Diarrhea since surgery.  No fever or chills.  No nausea or vomiting.  Feels dizzy at times when standing.  Abd cramps at times, mild.  No other new issues.    Review of Systems   Constitutional:  Negative for activity change, appetite change, chills and fever.   HENT:  Negative for congestion, dental problem and ear discharge.    Eyes:  Negative for discharge and itching.   Respiratory:  Negative for apnea, choking and chest tightness.    Cardiovascular:  Negative for chest pain and leg swelling.   Gastrointestinal:  Positive for diarrhea. Negative for abdominal distention, abdominal pain, anal bleeding, constipation and nausea.   Endocrine: Negative for cold intolerance and heat intolerance.   Genitourinary:  Negative for difficulty urinating and dyspareunia.   Musculoskeletal:  Negative for arthralgias and back pain.   Skin:  Negative for color change and pallor.   Neurological:  Positive for dizziness. Negative for facial asymmetry.   Hematological:  Negative for adenopathy. Does not bruise/bleed easily.   Psychiatric/Behavioral:  Negative for agitation and behavioral problems.      Objective:      There were no vitals filed for this visit.  Physical Exam  Constitutional:       General: She is not in acute distress.     Appearance: She is well-developed.   HENT:      Head: Normocephalic and atraumatic.   Eyes:      Pupils: Pupils are equal, round, and reactive to light.   Neck:      Thyroid: No thyromegaly.   Cardiovascular:      Rate and Rhythm: Normal rate and regular rhythm.   Pulmonary:      Effort: Pulmonary effort is normal.      Breath sounds: Normal breath sounds.   Abdominal:      General: Bowel sounds are normal. There is no distension.      Palpations: Abdomen is soft.       Tenderness: There is no abdominal tenderness.   Musculoskeletal:         General: No deformity. Normal range of motion.      Cervical back: Normal range of motion and neck supple.   Skin:     General: Skin is warm.      Capillary Refill: Capillary refill takes less than 2 seconds.      Findings: No erythema.   Neurological:      Mental Status: She is alert and oriented to person, place, and time.      Cranial Nerves: No cranial nerve deficit.   Psychiatric:         Behavior: Behavior normal.        Assessment:       1. Dehydration    2. Weakness    3. Irritable bowel syndrome, unspecified type        Plan:   Dehydration  -     CBC Auto Differential; Future; Expected date: 07/25/2023  -     sodium chloride 0.9% bolus 1,000 mL 1,000 mL    Weakness  -     CBC Auto Differential; Future; Expected date: 07/25/2023  -     sodium chloride 0.9% bolus 1,000 mL 1,000 mL    Irritable bowel syndrome, unspecified type  -     hyoscyamine (ANASPAZ,LEVSIN) 0.125 mg Tab; Take 1 tablet (125 mcg total) by mouth every 4 (four) hours as needed.  Dispense: 120 tablet; Refill: 1        Medical Decision Making/Counseling:  Dehdration with orthostatic vitals - recommend 1-2 liters bolus NS today in clinic.  Check CBC given previous anemia.    IBS - cramps - recommend levsin prn.    F/u pathology and follow-up surgery clinic in 2 weeks    Follow up: 2 weeks    Patient instructed that best way to communicate with my office staff is for patient to get on the [facility] patient portal to expedite communication and communication issues that may occur.  Patient was given instructions on how to get on the portal.  I encouraged patient to obtain portal access as well.  Ultimately it is up to the patient to obtain access.  Patient voiced understanding.

## 2023-07-26 VITALS
SYSTOLIC BLOOD PRESSURE: 120 MMHG | DIASTOLIC BLOOD PRESSURE: 79 MMHG | OXYGEN SATURATION: 99 % | HEART RATE: 81 BPM | BODY MASS INDEX: 20.33 KG/M2 | WEIGHT: 122 LBS | HEIGHT: 65 IN

## 2023-08-01 ENCOUNTER — PATIENT MESSAGE (OUTPATIENT)
Dept: SURGERY | Facility: CLINIC | Age: 33
End: 2023-08-01
Payer: COMMERCIAL

## 2023-08-08 ENCOUNTER — PATIENT MESSAGE (OUTPATIENT)
Dept: FAMILY MEDICINE | Facility: CLINIC | Age: 33
End: 2023-08-08
Payer: COMMERCIAL

## 2023-08-08 ENCOUNTER — TELEPHONE (OUTPATIENT)
Dept: GASTROENTEROLOGY | Facility: CLINIC | Age: 33
End: 2023-08-08
Payer: COMMERCIAL

## 2023-08-08 DIAGNOSIS — K58.1 IRRITABLE BOWEL SYNDROME WITH CONSTIPATION: Primary | ICD-10-CM

## 2023-08-08 NOTE — TELEPHONE ENCOUNTER
----- Message from Rebecca Nelson sent at 8/8/2023 11:32 AM CDT -----  Name of Who is Calling: pt        What is the request in detail:  pt is requesting to have her procedure cancelled. please assist with this matter.        Can the clinic reply by [facility]: yes        What Number to Call Back if not in [facility]: [phone] (home)

## 2023-08-08 NOTE — TELEPHONE ENCOUNTER
Call placed to Ms. Shagufta, she stated she is feeling better and wishes to cancel her EGD. No further issues noted.

## 2023-08-09 ENCOUNTER — OFFICE VISIT (OUTPATIENT)
Dept: SURGERY | Facility: CLINIC | Age: 33
End: 2023-08-09
Payer: COMMERCIAL

## 2023-08-09 VITALS
HEART RATE: 76 BPM | OXYGEN SATURATION: 98 % | BODY MASS INDEX: 21.16 KG/M2 | DIASTOLIC BLOOD PRESSURE: 72 MMHG | SYSTOLIC BLOOD PRESSURE: 118 MMHG | WEIGHT: 127 LBS | HEIGHT: 65 IN

## 2023-08-09 DIAGNOSIS — Z90.49 S/P LAPAROSCOPIC CHOLECYSTECTOMY: ICD-10-CM

## 2023-08-09 DIAGNOSIS — Z90.49 S/P LAPAROSCOPIC APPENDECTOMY: Primary | ICD-10-CM

## 2023-08-09 PROCEDURE — 99999 PR PBB SHADOW E&M-EST. PATIENT-LVL III: CPT | Mod: PBBFAC,,, | Performed by: STUDENT IN AN ORGANIZED HEALTH CARE EDUCATION/TRAINING PROGRAM

## 2023-08-09 PROCEDURE — 3078F PR MOST RECENT DIASTOLIC BLOOD PRESSURE < 80 MM HG: ICD-10-PCS | Mod: CPTII,S$GLB,, | Performed by: STUDENT IN AN ORGANIZED HEALTH CARE EDUCATION/TRAINING PROGRAM

## 2023-08-09 PROCEDURE — 3074F PR MOST RECENT SYSTOLIC BLOOD PRESSURE < 130 MM HG: ICD-10-PCS | Mod: CPTII,S$GLB,, | Performed by: STUDENT IN AN ORGANIZED HEALTH CARE EDUCATION/TRAINING PROGRAM

## 2023-08-09 PROCEDURE — 3078F DIAST BP <80 MM HG: CPT | Mod: CPTII,S$GLB,, | Performed by: STUDENT IN AN ORGANIZED HEALTH CARE EDUCATION/TRAINING PROGRAM

## 2023-08-09 PROCEDURE — 99024 POSTOP FOLLOW-UP VISIT: CPT | Mod: S$GLB,,, | Performed by: STUDENT IN AN ORGANIZED HEALTH CARE EDUCATION/TRAINING PROGRAM

## 2023-08-09 PROCEDURE — 3008F BODY MASS INDEX DOCD: CPT | Mod: CPTII,S$GLB,, | Performed by: STUDENT IN AN ORGANIZED HEALTH CARE EDUCATION/TRAINING PROGRAM

## 2023-08-09 PROCEDURE — 3008F PR BODY MASS INDEX (BMI) DOCUMENTED: ICD-10-PCS | Mod: CPTII,S$GLB,, | Performed by: STUDENT IN AN ORGANIZED HEALTH CARE EDUCATION/TRAINING PROGRAM

## 2023-08-09 PROCEDURE — 99024 PR POST-OP FOLLOW-UP VISIT: ICD-10-PCS | Mod: S$GLB,,, | Performed by: STUDENT IN AN ORGANIZED HEALTH CARE EDUCATION/TRAINING PROGRAM

## 2023-08-09 PROCEDURE — 99999 PR PBB SHADOW E&M-EST. PATIENT-LVL III: ICD-10-PCS | Mod: PBBFAC,,, | Performed by: STUDENT IN AN ORGANIZED HEALTH CARE EDUCATION/TRAINING PROGRAM

## 2023-08-09 PROCEDURE — 3074F SYST BP LT 130 MM HG: CPT | Mod: CPTII,S$GLB,, | Performed by: STUDENT IN AN ORGANIZED HEALTH CARE EDUCATION/TRAINING PROGRAM

## 2023-08-09 PROCEDURE — 1159F PR MEDICATION LIST DOCUMENTED IN MEDICAL RECORD: ICD-10-PCS | Mod: CPTII,S$GLB,, | Performed by: STUDENT IN AN ORGANIZED HEALTH CARE EDUCATION/TRAINING PROGRAM

## 2023-08-09 PROCEDURE — 1159F MED LIST DOCD IN RCRD: CPT | Mod: CPTII,S$GLB,, | Performed by: STUDENT IN AN ORGANIZED HEALTH CARE EDUCATION/TRAINING PROGRAM

## 2023-08-09 NOTE — PROGRESS NOTES
"General Surgery  [facility]  Follow-up    HPI/Follow-up exam:  Shagufta Bedoya is a 32 y.o. female 1 month s/p lap nickie and lap appy. At her first post op visit she c/o diarrhea and dehydration. She is now feeling much better. No longer having diarrhea. She is keeping good PO intake and able to eat what she wants. No incisions problems.    PHYSICAL EXAM:  /72   Pulse 76   Ht 5' 5" (1.651 m)   Wt 57.6 kg (127 lb)   SpO2 98%   BMI 21.13 kg/m²   Gen: Wd Wn female in NAD  Heent: Nc/At, MMM  Cv: RRR  Lung: Non-labored breathing, clear bilaterally  Abd: Soft, non-tender, non-distended, incisions healing well  Ext: No cyanosis clubbing or edema    Pathology:   Final Pathologic Diagnosis 1. Gallbladder, cholecystectomy:   - Benign gallbladder with cholelithiasis and cholesterolosis.     2. Appendix, appendectomy:   - Acute appendicitis with fecalith.        Assessment:  Shagufta Bedoya is a 32 y.o. female s/p lap appy and lap nickie, doing well    Plan/Medical Decision Making:    Followup: PRN    Patient instructed that best way to communicate with my office staff is for patient to get on the [facility] patient portal to expedite communication and communication issues that may occur.  Patient was given instructions on how to get on the portal.  I encouraged patient to obtain portal access as well.  Ultimately it is up to the patient to obtain access.  Patient voiced understanding.          "

## 2023-10-11 ENCOUNTER — PATIENT MESSAGE (OUTPATIENT)
Dept: FAMILY MEDICINE | Facility: CLINIC | Age: 33
End: 2023-10-11

## 2023-10-30 ENCOUNTER — OFFICE VISIT (OUTPATIENT)
Dept: URGENT CARE | Facility: CLINIC | Age: 33
End: 2023-10-30
Payer: COMMERCIAL

## 2023-10-30 VITALS
DIASTOLIC BLOOD PRESSURE: 61 MMHG | HEART RATE: 72 BPM | OXYGEN SATURATION: 99 % | BODY MASS INDEX: 21.66 KG/M2 | HEIGHT: 65 IN | SYSTOLIC BLOOD PRESSURE: 105 MMHG | WEIGHT: 130 LBS | RESPIRATION RATE: 18 BRPM | TEMPERATURE: 99 F

## 2023-10-30 DIAGNOSIS — S16.1XXA STRAIN OF NECK MUSCLE, INITIAL ENCOUNTER: ICD-10-CM

## 2023-10-30 DIAGNOSIS — M54.12 CERVICAL RADICULOPATHY: Primary | ICD-10-CM

## 2023-10-30 PROCEDURE — 99204 PR OFFICE/OUTPT VISIT, NEW, LEVL IV, 45-59 MIN: ICD-10-PCS | Mod: 25,S$GLB,, | Performed by: STUDENT IN AN ORGANIZED HEALTH CARE EDUCATION/TRAINING PROGRAM

## 2023-10-30 PROCEDURE — 99204 OFFICE O/P NEW MOD 45 MIN: CPT | Mod: 25,S$GLB,, | Performed by: STUDENT IN AN ORGANIZED HEALTH CARE EDUCATION/TRAINING PROGRAM

## 2023-10-30 PROCEDURE — 96372 THER/PROPH/DIAG INJ SC/IM: CPT | Mod: S$GLB,,, | Performed by: EMERGENCY MEDICINE

## 2023-10-30 PROCEDURE — 96372 PR INJECTION,THERAP/PROPH/DIAG2ST, IM OR SUBCUT: ICD-10-PCS | Mod: S$GLB,,, | Performed by: EMERGENCY MEDICINE

## 2023-10-30 RX ORDER — METHYLPREDNISOLONE 4 MG/1
TABLET ORAL
Qty: 21 EACH | Refills: 0 | Status: SHIPPED | OUTPATIENT
Start: 2023-10-31 | End: 2023-11-20

## 2023-10-30 RX ORDER — TIZANIDINE 4 MG/1
4 TABLET ORAL EVERY 8 HOURS PRN
Qty: 15 TABLET | Refills: 0 | Status: SHIPPED | OUTPATIENT
Start: 2023-10-30 | End: 2023-11-04

## 2023-10-30 RX ORDER — KETOROLAC TROMETHAMINE 30 MG/ML
60 INJECTION, SOLUTION INTRAMUSCULAR; INTRAVENOUS
Status: COMPLETED | OUTPATIENT
Start: 2023-10-30 | End: 2023-10-30

## 2023-10-30 RX ORDER — NAPROXEN 500 MG/1
500 TABLET ORAL 2 TIMES DAILY WITH MEALS
Qty: 20 TABLET | Refills: 0 | Status: SHIPPED | OUTPATIENT
Start: 2023-10-31 | End: 2023-11-10

## 2023-10-30 RX ORDER — DEXAMETHASONE SODIUM PHOSPHATE 4 MG/ML
8 INJECTION, SOLUTION INTRA-ARTICULAR; INTRALESIONAL; INTRAMUSCULAR; INTRAVENOUS; SOFT TISSUE
Status: COMPLETED | OUTPATIENT
Start: 2023-10-30 | End: 2023-10-30

## 2023-10-30 RX ADMIN — DEXAMETHASONE SODIUM PHOSPHATE 8 MG: 4 INJECTION, SOLUTION INTRA-ARTICULAR; INTRALESIONAL; INTRAMUSCULAR; INTRAVENOUS; SOFT TISSUE at 01:10

## 2023-10-30 RX ADMIN — KETOROLAC TROMETHAMINE 60 MG: 30 INJECTION, SOLUTION INTRAMUSCULAR; INTRAVENOUS at 01:10

## 2023-10-30 NOTE — PROGRESS NOTES
"Subjective:      Patient ID: Shagufta Bedoya is a 33 y.o. female.    Vitals:  height is 5' 5" (1.651 m) and weight is 59 kg (130 lb). Her oral temperature is 98.6 °F (37 °C). Her blood pressure is 105/61 and her pulse is 72. Her respiration is 18 and oxygen saturation is 99%.     Chief Complaint: Neck Pain    Patient is a 33-year-old female who presents to clinic for evaluation of neck pain.  Patient reports symptoms for approximately 2 weeks now patient denies any trauma or injury to the neck.  Patient states she believes it is possibly the way she sleeps or pillow.  Patient states that she has taken over-the-counter Tylenol with some relief to symptoms.  Patient states that she hurts down the right side of her neck.  Patient states pain is sometimes dull and aching and sometimes sharp in nature.  Patient states pain will radiate down into the shoulder.  Patient states pain is aggravated by movement of the head.  Patient states pain at current is rated a 5 on 10 scale but as worst a 7 on 10 scale.  Patient states that she is not noticed any swelling or skin discoloration.  Patient states that this is not occurred in the past.  Patient states that she does not experienced any headaches or dizziness, visual disturbances, fever or chills.    Neck Pain   This is a new problem. The current episode started 1 to 4 weeks ago. The problem occurs constantly. The problem has been rapidly worsening. The pain is associated with a sleep position. The pain is present in the left side. The quality of the pain is described as aching and cramping. The pain is at a severity of 5/10. The pain is mild. The symptoms are aggravated by position, bending and twisting. Pertinent negatives include no chest pain, fever, headaches or numbness.       Constitution: Negative for chills, sweating, fatigue and fever.   HENT: Negative.  Negative for ear pain.    Neck: Positive for neck pain. Negative for neck swelling.   Cardiovascular: Negative.  " Negative for chest pain and palpitations.   Eyes: Negative.         Denies visual disturbance   Respiratory: Negative.  Negative for cough and shortness of breath.    Gastrointestinal: Negative.  Negative for abdominal pain, nausea, vomiting, diarrhea and bowel incontinence.   Endocrine: negative.   Genitourinary: Negative.    Musculoskeletal:  Negative for trauma.   Skin: Negative.  Negative for color change, pale, rash and erythema.   Allergic/Immunologic: Negative.    Neurological: Negative.  Negative for dizziness, light-headedness, passing out, headaches, disorientation, altered mental status, numbness and tingling.   Hematologic/Lymphatic: Negative.    Psychiatric/Behavioral: Negative.  Negative for altered mental status, disorientation and confusion.       Objective:     Physical Exam   Constitutional: She is oriented to person, place, and time. She appears well-developed. She is cooperative.  Non-toxic appearance. She does not appear ill. No distress.   HENT:   Head: Normocephalic and atraumatic.   Ears:   Right Ear: Hearing, tympanic membrane, external ear and ear canal normal.   Left Ear: Hearing, tympanic membrane, external ear and ear canal normal.   Nose: Nose normal. No mucosal edema or nasal deformity. No epistaxis. Right sinus exhibits no maxillary sinus tenderness and no frontal sinus tenderness. Left sinus exhibits no maxillary sinus tenderness and no frontal sinus tenderness.   Mouth/Throat: Uvula is midline, oropharynx is clear and moist and mucous membranes are normal. No trismus in the jaw. Normal dentition. No uvula swelling.   Eyes: Conjunctivae and lids are normal. Pupils are equal, round, and reactive to light. Right eye exhibits no discharge. Left eye exhibits no discharge. No scleral icterus.   Neck: Trachea normal and phonation normal. Neck supple. No crepitus.          Comments: Right lateral paraspinal muscular tenderness radiating down into the shoulder.  No swelling or skin  discoloration noted.  No bony deformity or tenderness noted down the neck and spine.  No skin rash or lesion noted. No edema present. No erythema present. pain with movement present. No spinous process tenderness present. muscular tenderness present.   Cardiovascular: Normal rate, regular rhythm, normal heart sounds and normal pulses.   Pulmonary/Chest: Effort normal and breath sounds normal. No respiratory distress. She has no wheezes. She has no rhonchi. She has no rales.   Abdominal: Normal appearance and bowel sounds are normal. She exhibits no distension. Soft. There is no abdominal tenderness.   Musculoskeletal: Normal range of motion.         General: No signs of injury. Normal range of motion.   Neurological: She is alert and oriented to person, place, and time. She exhibits normal muscle tone.   Skin: Skin is warm, dry, intact, not diaphoretic, not pale and no rash. Capillary refill takes less than 2 seconds. No bruising and No erythema   Psychiatric: Her speech is normal and behavior is normal. Judgment and thought content normal.   Nursing note and vitals reviewed.      Assessment:     1. Cervical radiculopathy    2. Strain of neck muscle, initial encounter        Plan:       Cervical radiculopathy    Strain of neck muscle, initial encounter    Other orders  -     ketorolac injection 60 mg  -     dexAMETHasone injection 8 mg  -     naproxen (NAPROSYN) 500 MG tablet; Take 1 tablet (500 mg total) by mouth 2 (two) times daily with meals. for 10 days  Dispense: 20 tablet; Refill: 0  -     methylPREDNISolone (MEDROL DOSEPACK) 4 mg tablet; use as directed  Dispense: 21 each; Refill: 0  -     tiZANidine (ZANAFLEX) 4 MG tablet; Take 1 tablet (4 mg total) by mouth every 8 (eight) hours as needed (Neck pain).  Dispense: 15 tablet; Refill: 0                History and physical discussed with patient.  Patient wishes to hold off on imaging at current.  Toradol 60 mg IM and Decadron 8 mg IM in clinic.  Patient  tolerated well.  No complications noted.    Take medications as prescribed.  Use of no other NSAIDs while on naproxen; may rotate with Tylenol.  Use of no muscle relaxers while driving, operating heavy machinery or working.    Recommend rotating ice and warm moist heat as directed.    Follow-up with PCP in 1-2 days.    Return to clinic as needed.    To ED for any new or acutely worsening symptoms.    Patient in agreement with plan of care.    DISCLAIMER: Please note that my documentation in this Electronic Healthcare Record was produced using speech recognition software and therefore may contain errors related to that software system.These could include grammar, punctuation and spelling errors or the inclusion/exclusion of phrases that were not intended. Garbled syntax, mangled pronouns, and other bizarre constructions may be attributed to that software system.

## 2023-12-09 NOTE — TELEPHONE ENCOUNTER
Refill Routing Note   Medication(s) are not appropriate for processing by [facility] for the following reason(s):      Due for refill >6 months ago    [facility] action(s):  Defer Care Due:  None identified            Appointments  past 12m or future 3m with PCP    Date Provider   Last Visit   4/19/2023 Jolie Wallace MD   Next Visit   Visit date not found Jolie Wallace MD   ED visits in past 90 days: 0        Note composed:10:36 AM 12/09/2023

## 2023-12-09 NOTE — TELEPHONE ENCOUNTER
No care due was identified.  [facility] Embedded Care Due Messages. Reference number: [id number].   12/09/2023 10:34:53 AM CST

## 2023-12-11 RX ORDER — AMITRIPTYLINE HYDROCHLORIDE 10 MG/1
TABLET, FILM COATED ORAL
Qty: 180 TABLET | Refills: 1 | Status: SHIPPED | OUTPATIENT
Start: 2023-12-11

## 2024-01-05 ENCOUNTER — OFFICE VISIT (OUTPATIENT)
Dept: FAMILY MEDICINE | Facility: CLINIC | Age: 34
End: 2024-01-05
Payer: COMMERCIAL

## 2024-01-05 VITALS
SYSTOLIC BLOOD PRESSURE: 114 MMHG | HEIGHT: 65 IN | WEIGHT: 140.38 LBS | OXYGEN SATURATION: 97 % | RESPIRATION RATE: 16 BRPM | BODY MASS INDEX: 23.39 KG/M2 | HEART RATE: 95 BPM | DIASTOLIC BLOOD PRESSURE: 78 MMHG

## 2024-01-05 DIAGNOSIS — M54.2 NECK PAIN: Primary | ICD-10-CM

## 2024-01-05 DIAGNOSIS — M62.838 MUSCLE SPASM: ICD-10-CM

## 2024-01-05 PROCEDURE — 99214 OFFICE O/P EST MOD 30 MIN: CPT | Mod: S$GLB,,, | Performed by: STUDENT IN AN ORGANIZED HEALTH CARE EDUCATION/TRAINING PROGRAM

## 2024-01-05 PROCEDURE — 3074F SYST BP LT 130 MM HG: CPT | Mod: CPTII,S$GLB,, | Performed by: STUDENT IN AN ORGANIZED HEALTH CARE EDUCATION/TRAINING PROGRAM

## 2024-01-05 PROCEDURE — 3078F DIAST BP <80 MM HG: CPT | Mod: CPTII,S$GLB,, | Performed by: STUDENT IN AN ORGANIZED HEALTH CARE EDUCATION/TRAINING PROGRAM

## 2024-01-05 PROCEDURE — 99999 PR PBB SHADOW E&M-EST. PATIENT-LVL IV: CPT | Mod: PBBFAC,,, | Performed by: STUDENT IN AN ORGANIZED HEALTH CARE EDUCATION/TRAINING PROGRAM

## 2024-01-05 PROCEDURE — 1159F MED LIST DOCD IN RCRD: CPT | Mod: CPTII,S$GLB,, | Performed by: STUDENT IN AN ORGANIZED HEALTH CARE EDUCATION/TRAINING PROGRAM

## 2024-01-05 PROCEDURE — 3008F BODY MASS INDEX DOCD: CPT | Mod: CPTII,S$GLB,, | Performed by: STUDENT IN AN ORGANIZED HEALTH CARE EDUCATION/TRAINING PROGRAM

## 2024-01-05 RX ORDER — CYCLOBENZAPRINE HCL 10 MG
10 TABLET ORAL DAILY PRN
Qty: 30 TABLET | Refills: 0 | Status: SHIPPED | OUTPATIENT
Start: 2024-01-05

## 2024-01-05 RX ORDER — MELOXICAM 15 MG/1
15 TABLET ORAL DAILY
Qty: 30 TABLET | Refills: 0 | Status: SHIPPED | OUTPATIENT
Start: 2024-01-05

## 2024-01-05 NOTE — PROGRESS NOTES
Subjective:       Patient ID: Shagufta Bedoya is a 33 y.o. female.    Chief Complaint: Other (Pain in neck radiating from back of head and nausea, pt went to  10/30/23/Better but not resolved)    Chronic neck pain for years  Usually responsive to a pillow/positioning  She went to urgent care and was given steroid and nsaid injection.  Was given muscle relaxer and oral steroid and felt some better  She got a new pillow to help with this and helped some but it is worsening again  Currently bilateral neck on the right was radiating to her head and causing headache.  Felt awful and having nausea.  Started four days of muscle relaxer and it is improved but still very stiff.        Review of Systems   Constitutional:  Negative for activity change and appetite change.   Respiratory:  Negative for shortness of breath.    Cardiovascular:  Negative for chest pain.   Gastrointestinal:  Negative for abdominal pain and anal bleeding.   Genitourinary:  Negative for dysuria.   Musculoskeletal:  Positive for neck pain.   Integumentary:  Negative for rash.   Neurological:  Positive for headaches.   Psychiatric/Behavioral:  Negative for dysphoric mood and sleep disturbance. The patient is not nervous/anxious.          Objective:      Physical Exam  Constitutional:       General: She is not in acute distress.     Appearance: Normal appearance. She is not ill-appearing.   HENT:      Right Ear: Tympanic membrane, ear canal and external ear normal. There is no impacted cerumen.      Left Ear: Tympanic membrane, ear canal and external ear normal.      Nose: No congestion or rhinorrhea.      Mouth/Throat:      Pharynx: No oropharyngeal exudate or posterior oropharyngeal erythema.   Eyes:      Conjunctiva/sclera: Conjunctivae normal.   Cardiovascular:      Rate and Rhythm: Normal rate and regular rhythm.      Heart sounds: Normal heart sounds. No murmur heard.  Pulmonary:      Effort: Pulmonary effort is normal. No respiratory distress.       Breath sounds: Normal breath sounds.   Musculoskeletal:      Cervical back: Tenderness present.        Back:       Right lower leg: No edema.      Left lower leg: No edema.   Skin:     General: Skin is warm and dry.   Neurological:      Mental Status: She is alert. Mental status is at baseline.      Gait: Gait normal.   Psychiatric:         Mood and Affect: Mood normal.         Behavior: Behavior normal.         Thought Content: Thought content normal.         Judgment: Judgment normal.         Assessment:       1. Neck pain    2. Muscle spasm        Plan:       Problem List Items Addressed This Visit    None  Visit Diagnoses       Neck pain    -  Primary    Relevant Medications    meloxicam (MOBIC) 15 MG tablet    cyclobenzaprine (FLEXERIL) 10 MG tablet    Other Relevant Orders    Ambulatory referral/consult to Physical/Occupational Therapy    X-Ray Cervical Spine AP And Lateral    Muscle spasm        Relevant Medications    meloxicam (MOBIC) 15 MG tablet    cyclobenzaprine (FLEXERIL) 10 MG tablet    Other Relevant Orders    Ambulatory referral/consult to Physical/Occupational Therapy

## 2024-01-17 ENCOUNTER — HOSPITAL ENCOUNTER (OUTPATIENT)
Dept: RADIOLOGY | Facility: HOSPITAL | Age: 34
Discharge: HOME | End: 2024-01-17
Attending: STUDENT IN AN ORGANIZED HEALTH CARE EDUCATION/TRAINING PROGRAM
Payer: COMMERCIAL

## 2024-01-17 DIAGNOSIS — M54.2 NECK PAIN: ICD-10-CM

## 2024-01-17 PROCEDURE — 72040 X-RAY EXAM NECK SPINE 2-3 VW: CPT | Mod: TC,PN

## 2024-01-17 PROCEDURE — 72040 X-RAY EXAM NECK SPINE 2-3 VW: CPT | Mod: 26,,, | Performed by: RADIOLOGY

## 2024-01-24 ENCOUNTER — OFFICE VISIT (OUTPATIENT)
Dept: URGENT CARE | Facility: CLINIC | Age: 34
End: 2024-01-24
Payer: COMMERCIAL

## 2024-01-24 VITALS
DIASTOLIC BLOOD PRESSURE: 75 MMHG | HEART RATE: 120 BPM | BODY MASS INDEX: 21.66 KG/M2 | SYSTOLIC BLOOD PRESSURE: 129 MMHG | HEIGHT: 65 IN | WEIGHT: 130 LBS | TEMPERATURE: 98 F | RESPIRATION RATE: 18 BRPM

## 2024-01-24 DIAGNOSIS — B96.89 BACTERIAL URI: Primary | ICD-10-CM

## 2024-01-24 DIAGNOSIS — J06.9 BACTERIAL URI: Primary | ICD-10-CM

## 2024-01-24 DIAGNOSIS — J02.9 SORE THROAT: ICD-10-CM

## 2024-01-24 LAB
CTP QC/QA: YES
MOLECULAR STREP A: NEGATIVE

## 2024-01-24 PROCEDURE — 99214 OFFICE O/P EST MOD 30 MIN: CPT | Mod: S$GLB,,, | Performed by: NURSE PRACTITIONER

## 2024-01-24 PROCEDURE — 87651 STREP A DNA AMP PROBE: CPT | Mod: QW,,, | Performed by: NURSE PRACTITIONER

## 2024-01-24 RX ORDER — PREDNISONE 10 MG/1
10 TABLET ORAL DAILY
Qty: 4 TABLET | Refills: 0 | Status: SHIPPED | OUTPATIENT
Start: 2024-01-24 | End: 2024-01-28

## 2024-01-24 RX ORDER — AZITHROMYCIN 250 MG/1
TABLET, FILM COATED ORAL
Qty: 6 TABLET | Refills: 0 | Status: SHIPPED | OUTPATIENT
Start: 2024-01-24 | End: 2024-06-19

## 2024-01-24 RX ORDER — PROMETHAZINE HYDROCHLORIDE AND DEXTROMETHORPHAN HYDROBROMIDE 6.25; 15 MG/5ML; MG/5ML
5 SYRUP ORAL EVERY 4 HOURS PRN
Qty: 118 ML | Refills: 0 | Status: SHIPPED | OUTPATIENT
Start: 2024-01-24 | End: 2024-02-03

## 2024-01-25 NOTE — PROGRESS NOTES
"Subjective:       Patient ID: Shagufta Bedoya is a 33 y.o. female.    Vitals:  height is 5' 5" (1.651 m) and weight is 59 kg (130 lb). Her oral temperature is 98.2 °F (36.8 °C). Her blood pressure is 129/75 and her pulse is 120 (abnormal). Her respiration is 18.     Chief Complaint: Sore Throat (Sore throat since Sunday)    This is a 33 y.o. female who presents today with a chief complaint of sore throat since Sunday.        Sore Throat   This is a new problem. The current episode started in the past 7 days. The problem has been gradually worsening. The pain is worse on the left side. There has been no fever. The patient is experiencing no pain. Associated symptoms include congestion, coughing, a hoarse voice and swollen glands. Treatments tried: cold and flu meds      rajeev selmagalyer. The treatment provided mild relief.       HENT:  Positive for congestion and sore throat.    Respiratory:  Positive for cough.            Objective:      Physical Exam   Constitutional: She is oriented to person, place, and time. normal  HENT:   Head: Normocephalic and atraumatic.   Ears:   Right Ear: Tympanic membrane, external ear and ear canal normal.   Left Ear: Tympanic membrane, external ear and ear canal normal.   Nose: Congestion present.   Mouth/Throat: Mucous membranes are moist. Posterior oropharyngeal erythema present. Oropharynx is clear.   Eyes: Conjunctivae are normal. Extraocular movement intact   Neck: Neck supple.   Cardiovascular: Normal rate, regular rhythm, normal heart sounds and normal pulses.   Pulmonary/Chest: Effort normal and breath sounds normal.   Abdominal: Normal appearance.   Musculoskeletal: Normal range of motion.         General: Normal range of motion.   Neurological: She is alert and oriented to person, place, and time.   Skin: Skin is warm and dry.   Psychiatric: Her behavior is normal.   Vitals reviewed.        Past medical history and current medications reviewed.     Results for orders placed or " performed in visit on 01/24/24   POCT Strep A, Molecular   Result Value Ref Range    Molecular Strep A, POC Negative Negative     Acceptable Yes         NOTE: I offered Flu/Covid testing. Patient declined.  Assessment:           1. Bacterial URI    2. Sore throat              Plan:         Bacterial URI  -     azithromycin (Z-MARIANA) 250 MG tablet; Take 2 tablets by mouth on day 1; Take 1 tablet by mouth on days 2-5  Dispense: 6 tablet; Refill: 0  -     promethazine-dextromethorphan (PROMETHAZINE-DM) 6.25-15 mg/5 mL Syrp; Take 5 mLs by mouth every 4 (four) hours as needed (cough).  Dispense: 118 mL; Refill: 0  -     predniSONE (DELTASONE) 10 MG tablet; Take 1 tablet (10 mg total) by mouth once daily. for 4 days  Dispense: 4 tablet; Refill: 0    Sore throat  -     POCT Strep A, Molecular         INSTRUCTIONS  Meds as prescribed. Follow up as advised.

## 2024-02-19 ENCOUNTER — PATIENT MESSAGE (OUTPATIENT)
Dept: FAMILY MEDICINE | Facility: CLINIC | Age: 34
End: 2024-02-19
Payer: COMMERCIAL

## 2024-02-19 ENCOUNTER — PATIENT MESSAGE (OUTPATIENT)
Dept: ADMINISTRATIVE | Facility: HOSPITAL | Age: 34
End: 2024-02-19
Payer: COMMERCIAL

## 2024-02-20 ENCOUNTER — PATIENT MESSAGE (OUTPATIENT)
Dept: ADMINISTRATIVE | Facility: HOSPITAL | Age: 34
End: 2024-02-20
Payer: COMMERCIAL

## 2024-02-21 ENCOUNTER — PATIENT MESSAGE (OUTPATIENT)
Dept: ADMINISTRATIVE | Facility: HOSPITAL | Age: 34
End: 2024-02-21
Payer: COMMERCIAL

## 2024-02-21 ENCOUNTER — PATIENT OUTREACH (OUTPATIENT)
Dept: ADMINISTRATIVE | Facility: HOSPITAL | Age: 34
End: 2024-02-21
Payer: COMMERCIAL

## 2024-02-21 ENCOUNTER — TELEPHONE (OUTPATIENT)
Dept: FAMILY MEDICINE | Facility: CLINIC | Age: 34
End: 2024-02-21
Payer: COMMERCIAL

## 2024-02-21 DIAGNOSIS — D50.8 IRON DEFICIENCY ANEMIA SECONDARY TO INADEQUATE DIETARY IRON INTAKE: Primary | ICD-10-CM

## 2024-02-21 NOTE — PROGRESS NOTES
Population Health Chart Review & Patient Outreach Details    Outreach Performed: YES Portal    Additional [facility] Health Notes:           Updates Requested / Reviewed:               Health Maintenance Topics Overdue:    Health Maintenance Due   Topic Date Due    Lipid Panel  Never done    COVID-19 Vaccine (1) Never done    Pneumococcal Vaccines (Age 0-64) (1 of 2 - PCV) Never done    HIV Screening  Never done    Cervical Cancer Screening  07/17/2023    Influenza Vaccine (1) 09/01/2023         Health Maintenance Topic(s) Outreach Outcomes & Actions Taken:    Cervical Cancer Screening - Outreach Outcomes & Actions Taken  : outreached to schedule.

## 2024-02-22 ENCOUNTER — PATIENT OUTREACH (OUTPATIENT)
Dept: ADMINISTRATIVE | Facility: HOSPITAL | Age: 34
End: 2024-02-22
Payer: COMMERCIAL

## 2024-02-22 ENCOUNTER — PATIENT MESSAGE (OUTPATIENT)
Dept: ADMINISTRATIVE | Facility: HOSPITAL | Age: 34
End: 2024-02-22
Payer: COMMERCIAL

## 2024-02-22 NOTE — PROGRESS NOTES
Population Health Chart Review & Patient Outreach Details    Outreach Performed: YES Portal    Additional Pop Health Notes:           Updates Requested / Reviewed:      Updated Care Coordination Note         Health Maintenance Topics Overdue:    Health Maintenance Due   Topic Date Due    Lipid Panel  Never done    COVID-19 Vaccine (1) Never done    Pneumococcal Vaccines (Age 0-64) (1 of 2 - PCV) Never done    HIV Screening  Never done    Cervical Cancer Screening  07/17/2023    Influenza Vaccine (1) 09/01/2023         Health Maintenance Topic(s) Outreach Outcomes & Actions Taken:    Cervical Cancer Screening - Outreach Outcomes & Actions Taken  : Portal message sent to patient

## 2024-02-29 ENCOUNTER — TELEPHONE (OUTPATIENT)
Dept: FAMILY MEDICINE | Facility: CLINIC | Age: 34
End: 2024-02-29
Payer: COMMERCIAL

## 2024-02-29 NOTE — TELEPHONE ENCOUNTER
"From Hematology Oncology - external note. "1. Iron deficiency anemia - iron deficiency felt secondary to menstrual blood loss successfully managed with periodic IV iron replacement. Despite current serologic evaluation failing to demonstrate presence of anemia, she has noted to have replenished iron stores supported by low serum ferritin. In the setting of replenished iron stores and in order to prevent development of recurrent iron deficiency, recommendations are made for additional course of IV iron replacement. Given these recommendations I have offered to arrange IV iron replacement to be administered in our outpatient infusion center. However given she lives over an hour away from [place], she request to attempt to contact her internist locally to question whether they would be willing to order IV iron replacement locally based on our recommendations. Given this decision, she will notify the office if she is unsuccessful in having her internist order IV iron replacement."  "

## 2024-03-20 ENCOUNTER — PATIENT MESSAGE (OUTPATIENT)
Dept: FAMILY MEDICINE | Facility: CLINIC | Age: 34
End: 2024-03-20
Payer: COMMERCIAL

## 2024-04-04 ENCOUNTER — TELEPHONE (OUTPATIENT)
Dept: FAMILY MEDICINE | Facility: CLINIC | Age: 34
End: 2024-04-04
Payer: COMMERCIAL

## 2024-04-25 ENCOUNTER — LAB VISIT (OUTPATIENT)
Dept: LAB | Facility: HOSPITAL | Age: 34
End: 2024-04-25
Attending: FAMILY MEDICINE
Payer: COMMERCIAL

## 2024-04-25 ENCOUNTER — OFFICE VISIT (OUTPATIENT)
Dept: FAMILY MEDICINE | Facility: CLINIC | Age: 34
End: 2024-04-25
Payer: COMMERCIAL

## 2024-04-25 VITALS
HEART RATE: 94 BPM | HEIGHT: 65 IN | OXYGEN SATURATION: 99 % | RESPIRATION RATE: 18 BRPM | WEIGHT: 142.88 LBS | BODY MASS INDEX: 23.81 KG/M2 | SYSTOLIC BLOOD PRESSURE: 112 MMHG | DIASTOLIC BLOOD PRESSURE: 80 MMHG

## 2024-04-25 DIAGNOSIS — Z11.51 SCREENING FOR HUMAN PAPILLOMAVIRUS (HPV): ICD-10-CM

## 2024-04-25 DIAGNOSIS — Z13.220 ENCOUNTER FOR LIPID SCREENING FOR CARDIOVASCULAR DISEASE: Primary | ICD-10-CM

## 2024-04-25 DIAGNOSIS — Z13.1 SCREENING FOR DIABETES MELLITUS: ICD-10-CM

## 2024-04-25 DIAGNOSIS — D50.0 IRON DEFICIENCY ANEMIA DUE TO CHRONIC BLOOD LOSS: ICD-10-CM

## 2024-04-25 DIAGNOSIS — F41.1 GAD (GENERALIZED ANXIETY DISORDER): ICD-10-CM

## 2024-04-25 DIAGNOSIS — Z13.220 ENCOUNTER FOR LIPID SCREENING FOR CARDIOVASCULAR DISEASE: ICD-10-CM

## 2024-04-25 DIAGNOSIS — Z13.6 ENCOUNTER FOR LIPID SCREENING FOR CARDIOVASCULAR DISEASE: Primary | ICD-10-CM

## 2024-04-25 DIAGNOSIS — Z12.4 PAP SMEAR FOR CERVICAL CANCER SCREENING: ICD-10-CM

## 2024-04-25 DIAGNOSIS — Z13.6 ENCOUNTER FOR LIPID SCREENING FOR CARDIOVASCULAR DISEASE: ICD-10-CM

## 2024-04-25 LAB
CHOLEST SERPL-MCNC: 238 MG/DL (ref 120–199)
CHOLEST/HDLC SERPL: 5.3 {RATIO} (ref 2–5)
GLUCOSE SERPL-MCNC: 85 MG/DL (ref 70–110)
HDLC SERPL-MCNC: 45 MG/DL (ref 40–75)
HDLC SERPL: 18.9 % (ref 20–50)
LDLC SERPL CALC-MCNC: 172.8 MG/DL (ref 63–159)
NONHDLC SERPL-MCNC: 193 MG/DL
TRIGL SERPL-MCNC: 101 MG/DL (ref 30–150)

## 2024-04-25 PROCEDURE — 99999 PR PBB SHADOW E&M-EST. PATIENT-LVL IV: CPT | Mod: PBBFAC,,, | Performed by: FAMILY MEDICINE

## 2024-04-25 PROCEDURE — 80061 LIPID PANEL: CPT | Performed by: FAMILY MEDICINE

## 2024-04-25 PROCEDURE — 3074F SYST BP LT 130 MM HG: CPT | Mod: CPTII,S$GLB,, | Performed by: FAMILY MEDICINE

## 2024-04-25 PROCEDURE — 99395 PREV VISIT EST AGE 18-39: CPT | Mod: S$GLB,,, | Performed by: FAMILY MEDICINE

## 2024-04-25 PROCEDURE — 3008F BODY MASS INDEX DOCD: CPT | Mod: CPTII,S$GLB,, | Performed by: FAMILY MEDICINE

## 2024-04-25 PROCEDURE — 82947 ASSAY GLUCOSE BLOOD QUANT: CPT | Performed by: FAMILY MEDICINE

## 2024-04-25 PROCEDURE — 3079F DIAST BP 80-89 MM HG: CPT | Mod: CPTII,S$GLB,, | Performed by: FAMILY MEDICINE

## 2024-04-25 PROCEDURE — 88141 CYTOPATH C/V INTERPRET: CPT | Mod: ,,, | Performed by: PATHOLOGY

## 2024-04-25 PROCEDURE — 87624 HPV HI-RISK TYP POOLED RSLT: CPT | Performed by: FAMILY MEDICINE

## 2024-04-25 PROCEDURE — 88175 CYTOPATH C/V AUTO FLUID REDO: CPT | Performed by: PATHOLOGY

## 2024-04-25 PROCEDURE — 1159F MED LIST DOCD IN RCRD: CPT | Mod: CPTII,S$GLB,, | Performed by: FAMILY MEDICINE

## 2024-04-25 PROCEDURE — 36415 COLL VENOUS BLD VENIPUNCTURE: CPT | Performed by: FAMILY MEDICINE

## 2024-04-25 RX ORDER — BUSPIRONE HYDROCHLORIDE 5 MG/1
5 TABLET ORAL 2 TIMES DAILY
Qty: 60 TABLET | Refills: 11 | Status: SHIPPED | OUTPATIENT
Start: 2024-04-25 | End: 2025-04-25

## 2024-04-25 NOTE — PROGRESS NOTES
"Subjective:       Shagufta Bedoya is a 33 y.o. woman who comes in today for a  pap smear only. Her most recent annual exam was on 7/2020. Her most recent Pap smear was on 7/2020 and showed no abnormalities. Previous abnormal Pap smears: no. Contraception: OCP (estrogen/progesterone)    The following portions of the patient's history were reviewed and updated as appropriate: allergies, current medications, past family history, past medical history, past social history, past surgical history, and problem list.    Review of Systems  Pertinent items are noted in HPI.     Health Maintenance Due   Topic Date Due    Lipid Panel  Never done    Pneumococcal Vaccines (Age 0-64) (1 of 2 - PCV) Never done    HIV Screening  Never done    Cervical Cancer Screening  07/17/2023    Influenza Vaccine (1) 09/01/2023    COVID-19 Vaccine (1 - 2023-24 season) Never done     Health Maintenance reviewed and discussed - pap today.      Objective:      /80 (BP Location: Left arm, Patient Position: Sitting, BP Method: Medium (Manual))   Pulse 94   Resp 18   Ht 5' 5" (1.651 m)   Wt 64.8 kg (142 lb 14.4 oz)   SpO2 99%   BMI 23.78 kg/m²   Breasts: breasts appear normal, no suspicious masses, no skin or nipple changes or axillary nodes.  Pelvic Exam: cervix normal in appearance, external genitalia normal, and vagina normal without discharge. Pap smear obtained.     Assessment:      Screening pap smear.     Plan:      Follow up in 1 year, or as indicated by Pap results.                 "

## 2024-04-30 ENCOUNTER — PATIENT MESSAGE (OUTPATIENT)
Dept: FAMILY MEDICINE | Facility: CLINIC | Age: 34
End: 2024-04-30
Payer: COMMERCIAL

## 2024-05-01 LAB
FINAL PATHOLOGIC DIAGNOSIS: NORMAL
HPV HR 12 DNA SPEC QL NAA+PROBE: NEGATIVE
HPV16 AG SPEC QL: NEGATIVE
HPV18 DNA SPEC QL NAA+PROBE: NEGATIVE
Lab: NORMAL

## 2024-06-17 RX ORDER — KETOCONAZOLE 20 MG/ML
SHAMPOO, SUSPENSION TOPICAL
Qty: 120 ML | Refills: 11 | Status: SHIPPED | OUTPATIENT
Start: 2024-06-17

## 2024-06-17 NOTE — TELEPHONE ENCOUNTER
Refill Routing Note   Medication(s) are not appropriate for processing by [facility] for the following reason(s):        Outside of protocol    [facility] action(s):  Route             Appointments  past 12m or future 3m with PCP    Date Provider   Last Visit   4/25/2024 Jolie Wallace MD   Next Visit   Visit date not found Jolie Wallace MD   ED visits in past 90 days: 0        Note composed:9:03 AM 06/17/2024

## 2024-06-19 PROBLEM — L40.9 PSORIASIS: Status: ACTIVE | Noted: 2024-06-19

## 2024-06-23 ENCOUNTER — PATIENT MESSAGE (OUTPATIENT)
Dept: FAMILY MEDICINE | Facility: CLINIC | Age: 34
End: 2024-06-23
Payer: COMMERCIAL

## 2024-07-02 DIAGNOSIS — N92.0 MENORRHAGIA WITH REGULAR CYCLE: ICD-10-CM

## 2024-07-02 RX ORDER — LEVONORGESTREL AND ETHINYL ESTRADIOL AND ETHINYL ESTRADIOL 150-30(84)
KIT ORAL
Qty: 90 EACH | Refills: 3 | Status: SHIPPED | OUTPATIENT
Start: 2024-07-02

## 2024-07-02 NOTE — TELEPHONE ENCOUNTER
No care due was identified.  [facility] Embedded Care Due Messages. Reference number: [id number].   7/02/2024 12:33:14 PM CDT

## 2024-07-02 NOTE — TELEPHONE ENCOUNTER
Refill Decision Note   Shagufta Bedoya  is requesting a refill authorization.  Brief Assessment and Rationale for Refill:  Approve     Medication Therapy Plan:         Comments:     Note composed:12:55 PM 07/02/2024             Appointments     Last Visit   4/25/2024 Jolie Wallace MD   Next Visit   Visit date not found Jolie Wallace MD

## 2024-08-01 ENCOUNTER — PATIENT MESSAGE (OUTPATIENT)
Dept: FAMILY MEDICINE | Facility: CLINIC | Age: 34
End: 2024-08-01
Payer: COMMERCIAL

## 2024-08-14 RX ORDER — AMITRIPTYLINE HYDROCHLORIDE 10 MG/1
TABLET, FILM COATED ORAL
Qty: 180 TABLET | Refills: 1 | Status: SHIPPED | OUTPATIENT
Start: 2024-08-14

## 2024-08-14 NOTE — TELEPHONE ENCOUNTER
No care due was identified.  [facility] Embedded Care Due Messages. Reference number: [id number].   8/13/2024 8:27:03 PM CDT

## 2024-08-14 NOTE — TELEPHONE ENCOUNTER
Refill Routing Note   Medication(s) are not appropriate for processing by [facility] for the following reason(s):      Medication outside of protocol    [facility] action(s):  Route Care Due:  None identified     Medication Therapy Plan: PRN is outside of protocl for [facility]      Appointments  past 12m or future 3m with PCP    Date Provider   Last Visit   4/25/2024 Jolie Wallace MD   Next Visit   Visit date not found Jolie Wallace MD   ED visits in past 90 days: 0        Note composed:8:43 PM 08/13/2024

## 2024-08-23 DIAGNOSIS — K58.1 IRRITABLE BOWEL SYNDROME WITH CONSTIPATION: ICD-10-CM

## 2024-08-23 NOTE — TELEPHONE ENCOUNTER
No care due was identified.  [facility] Embedded Care Due Messages. Reference number: [id number].   8/23/2024 3:18:25 PM CDT

## 2024-08-24 NOTE — TELEPHONE ENCOUNTER
Refill Routing Note   Medication(s) are not appropriate for processing by [facility] for the following reason(s):        Outside of protocol    [facility] action(s):  Route             Appointments  past 12m or future 3m with PCP    Date Provider   Last Visit   4/25/2024 Jolie Wallace MD   Next Visit   Visit date not found Jolie Wallace MD   ED visits in past 90 days: 0        Note composed:11:58 PM 08/23/2024

## 2024-08-25 RX ORDER — LINACLOTIDE 290 UG/1
CAPSULE, GELATIN COATED ORAL
Qty: 30 CAPSULE | Refills: 11 | Status: SHIPPED | OUTPATIENT
Start: 2024-08-25

## 2024-09-25 ENCOUNTER — OFFICE VISIT (OUTPATIENT)
Dept: INFECTIOUS DISEASES | Facility: CLINIC | Age: 34
End: 2024-09-25
Payer: COMMERCIAL

## 2024-09-25 VITALS
HEIGHT: 65 IN | WEIGHT: 142 LBS | SYSTOLIC BLOOD PRESSURE: 120 MMHG | DIASTOLIC BLOOD PRESSURE: 62 MMHG | BODY MASS INDEX: 23.66 KG/M2 | OXYGEN SATURATION: 98 % | HEART RATE: 84 BPM | TEMPERATURE: 98 F

## 2024-09-25 DIAGNOSIS — R76.12 POSITIVE QUANTIFERON-TB GOLD TEST: ICD-10-CM

## 2024-09-25 DIAGNOSIS — Z22.7 TB LUNG, LATENT: Primary | ICD-10-CM

## 2024-09-25 PROCEDURE — 1160F RVW MEDS BY RX/DR IN RCRD: CPT | Mod: CPTII,S$GLB,, | Performed by: NURSE PRACTITIONER

## 2024-09-25 PROCEDURE — 1159F MED LIST DOCD IN RCRD: CPT | Mod: CPTII,S$GLB,, | Performed by: NURSE PRACTITIONER

## 2024-09-25 PROCEDURE — 99205 OFFICE O/P NEW HI 60 MIN: CPT | Mod: S$GLB,,, | Performed by: NURSE PRACTITIONER

## 2024-09-25 PROCEDURE — 3074F SYST BP LT 130 MM HG: CPT | Mod: CPTII,S$GLB,, | Performed by: NURSE PRACTITIONER

## 2024-09-25 PROCEDURE — 3078F DIAST BP <80 MM HG: CPT | Mod: CPTII,S$GLB,, | Performed by: NURSE PRACTITIONER

## 2024-09-25 PROCEDURE — 3008F BODY MASS INDEX DOCD: CPT | Mod: CPTII,S$GLB,, | Performed by: NURSE PRACTITIONER

## 2024-09-25 NOTE — PROGRESS NOTES
[facility] - Infectious Diseases   Department of Infectious Disease  Office Visit Note        PATIENT NAME: Shagufta Bedoya  YOB: 1990   MRN: [id number]  DATE OF VISIT: 09/25/2024    REASON FOR VISIT: Referral (New Patient for Positive TB Gold Test from Dr Esthela Steve)  Latent tuberculosis    HISTORY OF PRESENT ILLNESS     Shagufta Bedoya is a 34 y.o. female With chronic medical problems including psoriasis, fibromyalgia, Fuch's dystrophy, menorrhagia and anemia  who presents to Infectious Disease Clinic referred by Dermatology for positive TB gold test.  Patient was screened for tuberculosis  with QuantiFERON gold on 07/29/2024 prior to starting a biologic for psoriasis with a positive result.    She denies ever being tested for TB in the past including no tuberculin skin test in no vaccine. She denies any symptoms such as  weight loss, fevers or chills, cough or shortness a breath.  She has never worked in health care, never been in an institutional setting, and she has no known contacts or family members with tuberculosis.  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  She states her rash is usually around her scalp line and on her neck ears and brow. She tried to get into another office to see infectious disease since the positive test and was unable to get an appointment.  She was contacted by the [place] [facility] and started on  mg daily on September 9th and will be taking it for 9 months.  She said they offered her all of the regimens including once weekly DOT.   They will do once monthly lab testing and she has been following up with them.Since starting INH she has noticed that she has some brain fog, occasional blurred vision and had an episode a couple of days ago with chills, body aches and a temp of 99.3° that was improved when she woke up in the  morning.  She denies nausea or abdominal pain with INH.   She has a negative chest x-ray dated from 08/24/2024.  On 06/19 she had point of care HIV, hepatitis-C and syphilis testing which was all negative.   Most recent lab work from 07/29 with WBC 6.5, H&H 14.8/45.4, most recent chemistries from same date with creatinine 0.70, BUN 7, ALT/AST 11/13.Vital signs at time of visit with blood pressure 120/62, heart rate 84, temperature 98° and O2 sat 98%, weight 64 kg.    Outdoor activities: Lives at home with , 1 child, never smoker, occasional alcohol use, no drug use.  Travel:   Recently travel to [place] but otherwise has never been far from [place]  Implants: None  Antibiotic history:  None recent    Social History  Marital Status:   Alcohol History:  reports current alcohol use.  Tobacco History:  reports that she has never smoked. She has never been exposed to tobacco smoke. She has never used smokeless tobacco.  Drug History:  reports no history of drug use.    INTERVAL HISTORY     NA    ALLERGIES AND CURRENT MEDICATIONS     Review of patient's allergies indicates:   Allergen Reactions    Sulfa (sulfonamide antibiotics) Hives       Current Outpatient Medications   Medication Sig Dispense Refill    amitriptyline (ELAVIL) 10 MG tablet TAKE 2 TABLETS(20 MG) BY MOUTH EVERY NIGHT AS NEEDED FOR INSOMNIA 180 tablet 1    busPIRone (BUSPAR) 5 MG Tab Take 1 tablet (5 mg total) by mouth 2 (two) times daily. 60 tablet 11    clindamycin (CLEOCIN T) 1 % Swab Apply twice daily for acne as needed 60 each 5    clobetasoL (TEMOVATE) 0.05 % external solution Apply to scalp and ears twice a day as needed x 3 wks or less 50 mL 5    hydrocortisone 2.5 % cream Apply twice a day to rash on face for 3 wks or less only 28 g 2    ketoconazole (NIZORAL) 2 % shampoo APPLY TOPICALLY 2 TIMES A WEEK 120 mL 11    LINZESS 290 mcg Cap capsule TAKE 1 CAPSULE(290 MCG) BY MOUTH BEFORE BREAKFAST 30 capsule 11    SIMPESSE 0.15  mg-30 mcg (84)/10 mcg (7) 3MPk TAKE 1 TABLET BY MOUTH EVERY DAY 90 each 3    triamcinolone acetonide 0.1% (KENALOG) 0.1 % cream Apply twice daily to rash on body only as needed for 3 wks or less. 453 g 1    cyclobenzaprine (FLEXERIL) 10 MG tablet Take 1 tablet (10 mg total) by mouth daily as needed for Muscle spasms. (Patient not taking: Reported on 6/19/2024) 30 tablet 0    doxycycline (VIBRAMYCIN) 100 MG Cap Take 1 tablet twice daily for acne flares.Take w/food and water. Stop if pregnant. (Patient not taking: Reported on 9/25/2024) 60 capsule 1    hyoscyamine (ANASPAZ,LEVSIN) 0.125 mg Tab Take 1 tablet (125 mcg total) by mouth every 4 (four) hours as needed. 120 tablet 1    ondansetron (ZOFRAN-ODT) 4 MG TbDL DISSOLVE 1 TABLET(4 MG) ON THE TONGUE EVERY 6 HOURS AS NEEDED FOR NAUSEA (Patient not taking: Reported on 6/19/2024) 30 tablet 0    radiacream (VANICREAM) Apply 1-2 times a day for discoloration on face x 3 mo or less only (Patient not taking: Reported on 9/25/2024) 30 g 2    tretinoin (RETIN-A) 0.025 % cream Apply pea-sized amount nightly as tolerated for acne. Stop if pregnant. (Patient not taking: Reported on 9/25/2024) 45 g 11       MEDICAL/SURGICAL/FAMILY HISTORY     Past Surgical History:   Procedure Laterality Date    AUGMENTATION OF BREAST  10/07/2022    COLONOSCOPY      COLONOSCOPY N/A 7/5/2023    Procedure: COLONOSCOPY;  Surgeon: Mirian Alford MD;  Location: [facility];  Service: Endoscopy;  Laterality: N/A;    LAPAROSCOPIC APPENDECTOMY N/A 7/12/2023    Procedure: APPENDECTOMY, LAPAROSCOPIC;  Surgeon: Brodie Garcia MD;  Location: [facility] OR;  Service: General;  Laterality: N/A;    LAPAROSCOPIC CHOLECYSTECTOMY N/A 7/12/2023    Procedure: CHOLECYSTECTOMY-LAPAROSCOPIC;  Surgeon: Brodie Garcia MD;  Location: [facility] OR;  Service: General;  Laterality: N/A;    UPPER GASTROINTESTINAL ENDOSCOPY      WISDOM TOOTH EXTRACTION       Past Medical History:   Diagnosis Date    ADHD (attention  deficit hyperactivity disorder)     Anemia     Anxiety     Anxiety state 5/13/2021 10:50:43 AM    [facility] Historical - Quick Add: Anxiety and depression-No Additional Notes    Attention deficit disorder 5/13/2021 11:14:06 AM    [facility] Historical - Other: ADD (attention deficit disorder)-No Additional Notes    Esophageal reflux 5/13/2021 11:14:13 AM    [facility] Historical - Unknown: Acid reflux disease-No Additional Notes    Fibromyalgia 2015    Fibromyalgia      Family History   Problem Relation Name Age of Onset    Colon cancer Mother  76    Cancer Mother          cervical     Cancer Sister          cervical    Colon polyps Neg Hx      Esophageal cancer Neg Hx      Stomach cancer Neg Hx          REVIEW OF SYSTEMS     Review of Systems  Constitutional:  Denies fevers, chills, night sweats, loss of appetite.  HEENT: Denies ear pain, sinus congestion, mouth pain or trouble swallowing, sore throat or dental pain. + Fuch's dystrophy, blurred vision.  Neck: Denies neck pain or lumps.  Respiratory: Denies shortness of breath, coughing, wheezing or hemoptysis.  Cardiovascular: Denies chest pain, palpitations or edema.  Gastrointestinal: Denies nausea, vomiting, constipation or diarrhea.  Genitourinary: Denies dysuria, frequency, urgency or hematuria, + heavy periods improved on OCP  Musculoskeletal: Denies joint swelling or difficulty walking. + fibromyalgia.  Skin: Denies wounds, + psoriasis  Neurologic: Denies motor or sensory loss, headaches or dizziness. + brain fog since starting INH  Psychiatric: Denies changes in mood or behavior. + anxiety, ADHD  Endocrine:  No polyuria, polyphagia or polydipsia.  Heme/Lymph: No lymphadenopathy, + anemia    PHYSICAL EXAM     Vital Signs  Wt Readings from Last 3 Encounters:   09/25/24 64.4 kg (142 lb)   04/25/24 64.8 kg (142 lb 14.4 oz)   01/24/24 59 kg (130 lb)     Temp Readings from Last 3 Encounters:   09/25/24 98 °F (36.7 °C)   01/24/24 98.2 °F (36.8 °C) (Oral)    10/30/23 98.6 °F (37 °C) (Oral)     BP Readings from Last 3 Encounters:   09/25/24 120/62   04/25/24 112/80   01/24/24 129/75     Pulse Readings from Last 3 Encounters:   09/25/24 84   04/25/24 94   01/24/24 (!) 120       Physical Exam  General: Adult female, sitting up in chair awake and alert, she is a good historian and in no distress.  Eyes: Eyes with no icterus or injection. Vision grossly normal, wearing glasses.  Ears: Hearing grossly normal.  Nose: Nares patent  Mouth: Moist mucous membranes, dentition is good. No ulcerations, erythema or exudates.  Cardiovascular: Regular rate and rhythm, no murmurs, no edema.    Respiratory:  Clear to auscultation bilaterally, no tachypnea or increased work of breathing.  Musculoskeletal:  Moves all extremities with equal strength.   Independently ambulatory.  Skin: Warm and dry, no obvious rashes.    Neuro: Oriented, conversant, follows commands.  Psych: Good mood, normal affect.      WOUND     None    VASCULAR ACCESS DEVICE     None    LABS AND DIAGNOSTICS       Significant Labs: I have reviewed all relevant and available labs and microbiology. .    CBC LAST 7 DAYS  Lab Results   Component Value Date    WBC 6.5 07/29/2024    RBC 5.10 07/29/2024    HGB 14.8 07/29/2024    HCT 45.4 (H) 07/29/2024    MCV 89.0 07/29/2024    MCH 29.0 07/29/2024    MCHC 32.6 07/29/2024    RDW 12.6 07/29/2024     07/29/2024    MPV 9.6 07/29/2024    GRAN 4.0 07/25/2023    GRAN 63.8 07/25/2023    LYMPH 2,516 07/29/2024    LYMPH 38.7 07/29/2024    MONO 345 07/29/2024    MONO 5.3 07/29/2024    EOS 39 07/29/2024    BASO 39 07/29/2024    EOSINOPHIL 0.6 07/29/2024    BASOPHIL 0.6 07/29/2024         CHEMISTRY LAST 7 DAYS  CMP  Sodium   Date Value Ref Range Status   07/29/2024 139 135 - 146 mmol/L Final     Potassium   Date Value Ref Range Status   07/29/2024 4.6 3.5 - 5.3 mmol/L Final     Chloride   Date Value Ref Range Status   07/29/2024 104 98 - 110 mmol/L Final     CO2   Date Value Ref  "Range Status   07/29/2024 26 20 - 32 mmol/L Final     Glucose   Date Value Ref Range Status   07/29/2024 66 65 - 139 mg/dL Final     Comment:               Non-fasting reference interval          BUN   Date Value Ref Range Status   07/29/2024 7 7 - 25 mg/dL Final     Creatinine   Date Value Ref Range Status   07/29/2024 0.70 0.50 - 0.97 mg/dL Final     Calcium   Date Value Ref Range Status   07/29/2024 9.2 8.6 - 10.2 mg/dL Final     Total Protein   Date Value Ref Range Status   07/29/2024 6.7 6.1 - 8.1 g/dL Final     Albumin   Date Value Ref Range Status   07/29/2024 4.4 3.6 - 5.1 g/dL Final     Total Bilirubin   Date Value Ref Range Status   07/29/2024 0.3 0.2 - 1.2 mg/dL Final     Alkaline Phosphatase   Date Value Ref Range Status   05/09/2023 50 (L) 55 - 135 U/L Final     AST   Date Value Ref Range Status   07/29/2024 13 10 - 30 U/L Final     ALT   Date Value Ref Range Status   07/29/2024 11 6 - 29 U/L Final     Anion Gap   Date Value Ref Range Status   05/09/2023 7 (L) 8 - 16 mmol/L Final     eGFR   Date Value Ref Range Status   07/29/2024 116 > OR = 60 mL/min/1.73m2 Final     From Derm office 6/19/24  Testing Results:     HIV Insti Testing:  negative  LOT #: [id number]  EXP: 02/20/2025     Hep C Testing:  negative  LOT #: [id number]  EXP: 09/30/2024     Syphilis Testing:  negative  LOT #: [id number]  EXP: 05/2026    CrCl cannot be calculated (Patient's most recent lab result is older than the maximum 7 days allowed.).    INFLAMMATORY/PROCAL  LAST 7 DAYS  @LABRCNTIP[PROCAL:7, ESR:7, CRP:7@  No results found for: "ESR"  No results found for: "CRP"    PRIOR  MICROBIOLOGY:    No results found for the last 90 days.      LAST 7 DAYS MICROBIOLOGY   Microbiology Results (last 7 days)       ** No results found for the last 168 hours. **            PATHOLOGY  Specimens (From admission, onward)      None            CURRENT/PREVIOUS VISIT EKG  No results found for this or any previous visit.    Significant Diagnostics: I have " reviewed all relevant and available diagnostic results.    X-Ray Chest 1 View  Order: [id number]  Impression      Normal study    This report was signed by Gordon Hogan MD on 8/24/2024 7:50 AM on the following workstation: [id number].  Narrative    Chest one view    HISTORY: Nonspecific reaction to cell mediated immunity measurement of gamma interferon antigen response without active tuberculosis positive TB skin test    COMPARISON STUDIES AVAILABLE: None    Heart and mediastinum normal. Lungs fully inflated and clear with no infiltrate or effusion, granulomata or cavitary disease.  Exam End: 08/24/24 07:34       ASSESSMENT AND PLAN:     TB lung, latent    Positive QuantiFERON-TB Gold test  -     Ambulatory referral/consult to Infectious Disease    Continue  mg oral daily and vitamin B6 25 mg oral daily per [place] [facility] for 9 months     Start date September 9th -  estimated end date around June 4-11     We discussed latent tuberculosis, treatment options, side effects of drugs and  risks and benefits of treating LTBI  especially when considering taking a biologic.     I recommend that she closely follow-up with Ophthalmology and cornea specialist in light of history of Fuchs dystrophy     Monitor closely for side effects of INH  including rash,  fever, chills,  peripheral neuropathy, worsening of vision or memory impairment, nausea or vomiting, abdominal pain, rash, darkened urine or jaundice -  report to [place] [facility] nurse immediately if you feel like you were having side effects  that you were worried about     You are welcome to come here for your treatment but I would recommend you stay at the [place] [facility], they do a really great job with TB treatment and monitoring and you get the medicine and treatment for free    Follow up If you would like to see ID again.    I spent a total of 60 minutes on the day of the visit.  This  includes face to face time and non-face to face time preparing to see the patient (eg, review of tests), obtaining and/or reviewing separately obtained history, documenting clinical information in the electronic or other health record, independently interpreting results and communicating results to the patient/family/caregiver, or care coordinator.     Lyn Yang NP  Date of Service: 09/25/2024      This note was created using [employer]  voice recognition software that occasionally misinterpreted phrases or words.

## 2024-09-25 NOTE — PATIENT INSTRUCTIONS
Continue  mg oral daily and vitamin B6 25 mg oral daily per [place] [facility] for 9 months     Start date September 9th -  estimated end date around June 4-11     We discussed latent tuberculosis, treatment options, side effects of drugs and  risks and benefits of treating LTBI  especially when considering taking a biologic.     I recommend that she closely follow-up with Ophthalmology and cornea specialist in light of history of Fuchs dystrophy     Monitor closely for side effects of INH  including rash,  fever, chills,  peripheral neuropathy, worsening of vision or memory impairment, nausea or vomiting, abdominal pain, rash, darkened urine or jaundice -  report to [place] [facility] nurse immediately if you feel like you were having side effects  that you were worried about     You are welcome to come here for your treatment but I would recommend you stay at the [place] [facility], they do a really great job with TB treatment and monitoring and you get the medicine and treatment for free

## 2025-02-10 ENCOUNTER — OFFICE VISIT (OUTPATIENT)
Dept: FAMILY MEDICINE | Facility: CLINIC | Age: 35
End: 2025-02-10
Payer: COMMERCIAL

## 2025-02-10 VITALS
RESPIRATION RATE: 14 BRPM | WEIGHT: 149.38 LBS | HEART RATE: 71 BPM | HEIGHT: 65 IN | SYSTOLIC BLOOD PRESSURE: 120 MMHG | BODY MASS INDEX: 24.89 KG/M2 | DIASTOLIC BLOOD PRESSURE: 86 MMHG | OXYGEN SATURATION: 98 %

## 2025-02-10 DIAGNOSIS — M77.11 LATERAL EPICONDYLITIS OF RIGHT ELBOW: Primary | ICD-10-CM

## 2025-02-10 PROCEDURE — 3008F BODY MASS INDEX DOCD: CPT | Mod: CPTII,S$GLB,, | Performed by: FAMILY MEDICINE

## 2025-02-10 PROCEDURE — 3079F DIAST BP 80-89 MM HG: CPT | Mod: CPTII,S$GLB,, | Performed by: FAMILY MEDICINE

## 2025-02-10 PROCEDURE — 99999 PR PBB SHADOW E&M-EST. PATIENT-LVL V: CPT | Mod: PBBFAC,,, | Performed by: FAMILY MEDICINE

## 2025-02-10 PROCEDURE — 99213 OFFICE O/P EST LOW 20 MIN: CPT | Mod: S$GLB,,, | Performed by: FAMILY MEDICINE

## 2025-02-10 PROCEDURE — 3074F SYST BP LT 130 MM HG: CPT | Mod: CPTII,S$GLB,, | Performed by: FAMILY MEDICINE

## 2025-02-10 PROCEDURE — 1159F MED LIST DOCD IN RCRD: CPT | Mod: CPTII,S$GLB,, | Performed by: FAMILY MEDICINE

## 2025-02-10 RX ORDER — ISONIAZID 300 MG/1
TABLET ORAL
COMMUNITY
Start: 2024-10-03

## 2025-02-10 RX ORDER — MELOXICAM 15 MG/1
15 TABLET ORAL DAILY
Qty: 30 TABLET | Refills: 1 | Status: SHIPPED | OUTPATIENT
Start: 2025-02-10

## 2025-02-10 RX ORDER — PYRIDOXINE HCL (VITAMIN B6) 25 MG
1 TABLET ORAL DAILY
COMMUNITY
Start: 2024-11-26 | End: 2025-11-26

## 2025-02-10 NOTE — PROGRESS NOTES
Subjective:       Patient ID: Shagufta Bedoya is a 34 y.o. female.    Chief Complaint: Tendonitis (Ongoing since late November, has worsened over that time period. It tends to flare up at times. Swelling in pointer finger.)    Right shoulder tendonitis severe since November.   Started late November.   Got bad in December. She got a compression sleeve and took a 5 day course of toradol.   She does not know that it gave her any relief. She has not taken anything since. It comes and goes- pain is always there but the severity of the pain will change dramatically.   Pain is lateral     She also has bilateral index finger pain and tenderness which has been there since she did cryotherapy on her hands with Dr. Steve. This further complicates pain from the elbow.     Tendonitis  Pertinent negatives include no abdominal pain, fatigue, fever or rash.       .  Patient Active Problem List   Diagnosis    Episodic tension-type headache, not intractable    Fibromyalgia    Hypovitaminosis D    Iron deficiency anemia due to chronic blood loss    Adult ADHD    Primary insomnia    Menorrhagia with regular cycle    Allergy to sulfa drugs    Food allergy    Psoriasis     Shagufta has a current medication list which includes the following prescription(s): amitriptyline, buspirone, clindamycin, clobetasol, doxycycline, hydrocortisone, isoniazid, ketoconazole, pyridoxine (vitamin b6), radiacream, simpesse, tretinoin, triamcinolone acetonide 0.1%, cyclobenzaprine, hyoscyamine, linzess, meloxicam, and ondansetron, and the following Facility-Administered Medications: ceFOXItin (MEFOXIN) 2 g in dextrose 5 % (D5W) 50 mL IVPB, sodium chloride 0.9%, and sodium chloride 0.9%.    Review of Systems   Constitutional:  Negative for activity change, appetite change, fatigue and fever.   Respiratory:  Negative for shortness of breath.    Gastrointestinal:  Negative for abdominal pain.   Integumentary:  Negative for rash.         Health Maintenance Due    Topic Date Due    HIV Screening  Never done    Mammogram  Never done    Pneumococcal Vaccines (Age 0-49) (1 of 2 - PCV) Never done    Influenza Vaccine (1) 09/01/2024    COVID-19 Vaccine (1 - 2024-25 season) Never done      Health Maintenance reviewed and discussed  Objective:      Physical Exam  Constitutional:       General: She is not in acute distress.     Appearance: Normal appearance. She is not ill-appearing.   Pulmonary:      Effort: Pulmonary effort is normal. No respiratory distress.   Musculoskeletal:      Comments: Tenderness overlying right lateral epicondyle; Pain with tendon loading. + swelling noted in index fingers   Neurological:      Mental Status: She is alert.   Psychiatric:         Mood and Affect: Mood normal.         Behavior: Behavior normal.         Thought Content: Thought content normal.         Judgment: Judgment normal.         Assessment:       1. Lateral epicondylitis of right elbow        Plan:       1. Lateral epicondylitis of right elbow  -     meloxicam (MOBIC) 15 MG tablet; Take 1 tablet (15 mg total) by mouth once daily.  Dispense: 30 tablet; Refill: 1  -     Ambulatory referral/consult to Orthopedics; Future; Expected date: 02/17/2025

## 2025-02-19 ENCOUNTER — PATIENT MESSAGE (OUTPATIENT)
Dept: FAMILY MEDICINE | Facility: CLINIC | Age: 35
End: 2025-02-19
Payer: COMMERCIAL

## 2025-02-25 ENCOUNTER — OFFICE VISIT (OUTPATIENT)
Dept: URGENT CARE | Facility: CLINIC | Age: 35
End: 2025-02-25
Payer: COMMERCIAL

## 2025-02-25 VITALS
BODY MASS INDEX: 24.83 KG/M2 | RESPIRATION RATE: 18 BRPM | SYSTOLIC BLOOD PRESSURE: 114 MMHG | HEIGHT: 65 IN | OXYGEN SATURATION: 100 % | DIASTOLIC BLOOD PRESSURE: 80 MMHG | HEART RATE: 145 BPM | TEMPERATURE: 99 F | WEIGHT: 149 LBS

## 2025-02-25 DIAGNOSIS — J01.00 ACUTE NON-RECURRENT MAXILLARY SINUSITIS: Primary | ICD-10-CM

## 2025-02-25 PROCEDURE — 99214 OFFICE O/P EST MOD 30 MIN: CPT | Mod: 25,S$GLB,, | Performed by: NURSE PRACTITIONER

## 2025-02-25 PROCEDURE — 96372 THER/PROPH/DIAG INJ SC/IM: CPT | Mod: S$GLB,,, | Performed by: NURSE PRACTITIONER

## 2025-02-25 RX ORDER — DEXAMETHASONE SODIUM PHOSPHATE 4 MG/ML
4 INJECTION, SOLUTION INTRA-ARTICULAR; INTRALESIONAL; INTRAMUSCULAR; INTRAVENOUS; SOFT TISSUE
Status: COMPLETED | OUTPATIENT
Start: 2025-02-25 | End: 2025-02-25

## 2025-02-25 RX ORDER — PROMETHAZINE HYDROCHLORIDE AND DEXTROMETHORPHAN HYDROBROMIDE 6.25; 15 MG/5ML; MG/5ML
5 SYRUP ORAL EVERY 4 HOURS PRN
Qty: 120 ML | Refills: 0 | Status: SHIPPED | OUTPATIENT
Start: 2025-02-25 | End: 2025-03-07

## 2025-02-25 RX ORDER — FLUTICASONE PROPIONATE 50 MCG
2 SPRAY, SUSPENSION (ML) NASAL DAILY
Qty: 15.8 ML | Refills: 0 | Status: SHIPPED | OUTPATIENT
Start: 2025-02-25

## 2025-02-25 RX ORDER — AMOXICILLIN AND CLAVULANATE POTASSIUM 875; 125 MG/1; MG/1
1 TABLET, FILM COATED ORAL EVERY 12 HOURS
Qty: 20 TABLET | Refills: 0 | Status: SHIPPED | OUTPATIENT
Start: 2025-02-25 | End: 2025-03-07

## 2025-02-25 RX ADMIN — DEXAMETHASONE SODIUM PHOSPHATE 4 MG: 4 INJECTION, SOLUTION INTRA-ARTICULAR; INTRALESIONAL; INTRAMUSCULAR; INTRAVENOUS; SOFT TISSUE at 12:02

## 2025-02-25 NOTE — PROGRESS NOTES
"Subjective:      Patient ID: Shagufta Bedoya is a 34 y.o. female.    Vitals:  height is 5' 5" (1.651 m) and weight is 67.6 kg (149 lb). Her temperature is 99.3 °F (37.4 °C). Her blood pressure is 114/80 and her pulse is 145 (abnormal). Her respiration is 18 and oxygen saturation is 100%.     Chief Complaint: Cough (Cough, fever, headache, full body aches and frequent urination. - Entered by patient), Fever, and Nasal Congestion    Patient is a 35 yo female who presents to clinic today for evaluation of cough, fever, bodyaches, headache for 1 days. Pt has been taking some OTC medications for sx relief.       Cough  This is a new problem. The current episode started yesterday. Associated symptoms include headaches and postnasal drip. Pertinent negatives include no chest pain, chills, ear pain, fever, myalgias, rash, sore throat or shortness of breath.       Constitution: Negative for chills, sweating, fatigue and fever.   HENT:  Positive for postnasal drip. Negative for ear pain, congestion and sore throat.    Neck: neck negative.   Cardiovascular: Negative.  Negative for chest pain and palpitations.   Eyes: Negative.    Respiratory:  Positive for cough. Negative for chest tightness and shortness of breath.    Gastrointestinal: Negative.  Negative for abdominal pain, nausea, vomiting and diarrhea.   Endocrine: negative.   Genitourinary: Negative.    Musculoskeletal: Negative.  Negative for muscle ache.   Skin: Negative.  Negative for color change, pale, rash and erythema.   Allergic/Immunologic: Negative.    Neurological:  Positive for headaches. Negative for dizziness, light-headedness, passing out, disorientation and altered mental status.   Hematologic/Lymphatic: Negative.    Psychiatric/Behavioral: Negative.  Negative for altered mental status, disorientation and confusion.       Objective:     Physical Exam   Constitutional: She is oriented to person, place, and time. She appears well-developed. She is " cooperative.  Non-toxic appearance. She does not appear ill. No distress.   HENT:   Head: Normocephalic and atraumatic.   Ears:   Right Ear: Hearing, tympanic membrane, external ear and ear canal normal.   Left Ear: Hearing, tympanic membrane, external ear and ear canal normal.   Nose: Mucosal edema and rhinorrhea present. No nasal deformity or congestion. No epistaxis. Right sinus exhibits maxillary sinus tenderness. Right sinus exhibits no frontal sinus tenderness. Left sinus exhibits maxillary sinus tenderness. Left sinus exhibits no frontal sinus tenderness.   Mouth/Throat: Uvula is midline, oropharynx is clear and moist and mucous membranes are normal. Mucous membranes are moist. No trismus in the jaw. Normal dentition. No uvula swelling. No oropharyngeal exudate or posterior oropharyngeal erythema. Oropharynx is clear.   Eyes: Conjunctivae and lids are normal. Pupils are equal, round, and reactive to light. Right eye exhibits no discharge. Left eye exhibits no discharge. No scleral icterus.   Neck: Trachea normal and phonation normal. Neck supple. No neck rigidity present.   Cardiovascular: Regular rhythm, normal heart sounds and normal pulses. Tachycardia present.   Pulmonary/Chest: Effort normal and breath sounds normal. No respiratory distress. She has no wheezes. She has no rhonchi. She has no rales.   Abdominal: Normal appearance and bowel sounds are normal. She exhibits no distension. Soft. There is no abdominal tenderness.   Musculoskeletal: Normal range of motion.         General: Normal range of motion.      Cervical back: She exhibits no tenderness.   Lymphadenopathy:     She has no cervical adenopathy.   Neurological: She is alert and oriented to person, place, and time. She exhibits normal muscle tone.   Skin: Skin is warm, dry, intact, not diaphoretic, not pale and no rash. Capillary refill takes less than 2 seconds. No erythema   Psychiatric: Her speech is normal and behavior is normal. Judgment  and thought content normal.   Nursing note and vitals reviewed.      Assessment:     1. Acute non-recurrent maxillary sinusitis        Plan:       Acute non-recurrent maxillary sinusitis    Other orders  -     amoxicillin-clavulanate 875-125mg (AUGMENTIN) 875-125 mg per tablet; Take 1 tablet by mouth every 12 (twelve) hours. for 10 days  Dispense: 20 tablet; Refill: 0  -     promethazine-dextromethorphan (PROMETHAZINE-DM) 6.25-15 mg/5 mL Syrp; Take 5 mLs by mouth every 4 (four) hours as needed (cough).  Dispense: 120 mL; Refill: 0  -     dexAMETHasone injection 4 mg  -     fluticasone propionate (FLONASE) 50 mcg/actuation nasal spray; 2 sprays (100 mcg total) by Each Nostril route once daily.  Dispense: 15.8 mL; Refill: 0                Pulse recheck: 120 pt to monitor pulse at home.   Labs: Flu A: negative Flu B: negative   Take medications as prescribed.  Tylenol/Motrin per package instructions for any pain or fever.  Assure adequate hydration and rest.  Throat lozenges or Chloraseptic per package instructions for sore throat.    Warm salt water gargles every 2-3 hours as needed for sore throat.    Nasal saline flushes or irrigation as directed for nasal saline congestion and sinus related symptoms.  Follow-up with PCP in 1-2 days.  Return to clinic as needed.  To ED for any new or acutely worsening symptoms.  Patient in agreement with plan of care.

## 2025-02-25 NOTE — LETTER
February 25, 2025      [facility] [place]  [address]  [address]  [place] [address]  Phone: [phone]  Fax: [phone]       Patient: Shagufta Bedoya   YOB: 1990  Date of Visit: 02/25/2025    To Whom It May Concern:    Edwin Bedoya  was at [facility] on 02/25/2025. The patient may return to work/school on 02/26/25 with no restrictions. If you have any questions or concerns, or if I can be of further assistance, please do not hesitate to contact me.    Sincerely,    Deirdre Chapman, NP

## 2025-05-22 ENCOUNTER — PATIENT MESSAGE (OUTPATIENT)
Dept: FAMILY MEDICINE | Facility: CLINIC | Age: 35
End: 2025-05-22
Payer: COMMERCIAL

## 2025-05-30 ENCOUNTER — OFFICE VISIT (OUTPATIENT)
Dept: FAMILY MEDICINE | Facility: CLINIC | Age: 35
End: 2025-05-30
Payer: COMMERCIAL

## 2025-05-30 VITALS
WEIGHT: 145.63 LBS | SYSTOLIC BLOOD PRESSURE: 118 MMHG | OXYGEN SATURATION: 99 % | HEIGHT: 65 IN | RESPIRATION RATE: 16 BRPM | BODY MASS INDEX: 24.26 KG/M2 | HEART RATE: 84 BPM | DIASTOLIC BLOOD PRESSURE: 82 MMHG

## 2025-05-30 DIAGNOSIS — R10.84 GENERALIZED ABDOMINAL PAIN: Primary | ICD-10-CM

## 2025-05-30 DIAGNOSIS — R11.0 NAUSEA: ICD-10-CM

## 2025-05-30 DIAGNOSIS — K59.09 CHRONIC CONSTIPATION: ICD-10-CM

## 2025-05-30 PROCEDURE — 99999 PR PBB SHADOW E&M-EST. PATIENT-LVL V: CPT | Mod: PBBFAC,,, | Performed by: STUDENT IN AN ORGANIZED HEALTH CARE EDUCATION/TRAINING PROGRAM

## 2025-05-30 RX ORDER — ONDANSETRON 4 MG/1
4 TABLET, ORALLY DISINTEGRATING ORAL EVERY 8 HOURS PRN
Qty: 30 TABLET | Refills: 0 | Status: SHIPPED | OUTPATIENT
Start: 2025-05-30

## 2025-05-30 NOTE — PROGRESS NOTES
Subjective:       Patient ID: Shagufta Bedoya is a 34 y.o. female.    Chief Complaint: Abdominal Pain (Gallbladder was removed about 2 years ago. Pain in the upper right gastric area. Worse first thing in AM and after eating. Bowel movements are about every 4 days)    History of Present Illness    CHIEF COMPLAINT:  Ms. Bedoya presents today with abdominal pain similar to symptoms experienced before gallbladder removal    CURRENT ABDOMINAL SYMPTOMS:  She reports daily abdominal symptoms that began after cholecystectomy, which were initially intermittent with months between episodes. She experiences morning symptoms including pain and nausea that interfere with coffee consumption, typically improving through mid-morning. While lunch occasionally triggers symptoms, dinner consistently exacerbates the condition with post-prandial pain and significant nausea. She describes a persistent sensation of fullness or pressure in the area of previous cholecystectomy, characterizing it as a mass-like feeling.    GI HISTORY:  She has history of severe constipation, previously going up to two weeks without bowel movements. Since cholecystectomy, she reports significant improvement with multiple bowel movements daily, though not as regular as when on Truntest. Previously experienced intense stomach cramps associated with constipation have resolved. She denies current diarrhea or blood in stool.    DIET AND FOOD TOLERANCE:  She reports improved tolerance to dairy products since surgery, now able to consume yogurt daily, milk, and ice cream. However, she continues to experience immediate GI discomfort with whey-containing products.    MEDICATION HISTORY:  She reports history of severe constipation with Zofran.       Review of Systems   Constitutional:  Negative for activity change and appetite change.   Respiratory:  Negative for shortness of breath.    Cardiovascular:  Negative for chest pain.   Gastrointestinal:  Positive for  abdominal pain and constipation.   Genitourinary:  Negative for dysuria.   Integumentary:  Negative for rash.   Psychiatric/Behavioral:  Negative for depressed mood and sleep disturbance. The patient is not nervous/anxious.       Problem List[1]  Shagufta has a current medication list which includes the following prescription(s): amitriptyline, clindamycin, clobetasol, clobetasol, tremfya, hydrocortisone, hyoscyamine, isoniazid, ketoconazole, meloxicam, pyridoxine (vitamin b6), simpesse, triamcinolone acetonide 0.1%, buspirone, cyclobenzaprine, doxycycline, linzess, ondansetron, radiacream, and tretinoin, and the following Facility-Administered Medications: ceFOXItin (MEFOXIN) 2 g in dextrose 5 % (D5W) 50 mL IVPB, sodium chloride 0.9%, and sodium chloride 0.9%.        Health Maintenance Due   Topic Date Due    HIV Screening  Never done    Mammogram  Never done    Pneumococcal Vaccines (Age 0-49) (1 of 2 - PCV) Never done    COVID-19 Vaccine (1 - 2024-25 season) Never done        Objective:      Physical Exam  Constitutional:       General: She is not in acute distress.     Appearance: Normal appearance. She is not ill-appearing.   Eyes:      Conjunctiva/sclera: Conjunctivae normal.   Cardiovascular:      Rate and Rhythm: Normal rate and regular rhythm.      Heart sounds: Normal heart sounds. No murmur heard.  Pulmonary:      Effort: Pulmonary effort is normal. No respiratory distress.      Breath sounds: Normal breath sounds. No wheezing or rales.   Abdominal:      General: There is no distension.      Palpations: Abdomen is soft. There is no mass.      Tenderness: There is abdominal tenderness (ruq). There is no guarding or rebound.      Hernia: No hernia is present.   Musculoskeletal:      Right lower leg: No edema.      Left lower leg: No edema.   Lymphadenopathy:      Cervical: No cervical adenopathy.   Skin:     General: Skin is warm and dry.   Neurological:      Mental Status: She is alert. Mental status is at  baseline.      Gait: Gait normal.   Psychiatric:         Mood and Affect: Mood normal.         Behavior: Behavior normal.         Thought Content: Thought content normal.         Judgment: Judgment normal.             Assessment:       Assessment & Plan    1. Patient reports abdominal pain and nausea, similar to symptoms before gallbladder removal.  2. Symptoms progressed in frequency, duration, and intensity.  3. Pain localized to area of previous gallbladder, with sensation of fullness or pressure.  4. Considered CT Abdomen to evaluate liver, ducts, and colon-liver junction.  5. Assessed for potential liver enlargement or other abdominal abnormalities.           Plan:       1. Generalized abdominal pain  -     Cancel: CT Abdomen Pelvis With IV Contrast Routine Oral Contrast; Future; Expected date: 05/30/2025  -     Cancel: CT Abdomen Pelvis With IV Contrast Routine Oral Contrast; Future; Expected date: 05/30/2025  -     CT Abdomen Pelvis With IV Contrast Routine Oral Contrast; Future; Expected date: 05/30/2025    2. Chronic constipation  Assessment & Plan:  Was taking linzess.  Still having some constipation but better than before      3. Nausea  -     ondansetron (ZOFRAN-ODT) 4 MG TbDL; Take 1 tablet (4 mg total) by mouth every 8 (eight) hours as needed (nausea).  Dispense: 30 tablet; Refill: 0         This note was generated with the assistance of ambient listening technology. Verbal consent was obtained by the patient and accompanying visitor(s) for the recording of patient appointment to facilitate this note. I attest to having reviewed and edited the generated note for accuracy, though some syntax or spelling errors may persist. Please contact the author of this note for any clarification.                    [1]   Patient Active Problem List  Diagnosis    Episodic tension-type headache, not intractable    Fibromyalgia    Hypovitaminosis D    Iron deficiency anemia due to chronic blood loss    Adult ADHD     Primary insomnia    Menorrhagia with regular cycle    Allergy to sulfa drugs    Food allergy    Psoriasis    Chronic constipation

## 2025-05-31 ENCOUNTER — PATIENT MESSAGE (OUTPATIENT)
Dept: FAMILY MEDICINE | Facility: CLINIC | Age: 35
End: 2025-05-31
Payer: COMMERCIAL

## 2025-05-31 ENCOUNTER — HOSPITAL ENCOUNTER (OUTPATIENT)
Dept: RADIOLOGY | Facility: HOSPITAL | Age: 35
Discharge: HOME | End: 2025-05-31
Attending: STUDENT IN AN ORGANIZED HEALTH CARE EDUCATION/TRAINING PROGRAM
Payer: COMMERCIAL

## 2025-05-31 DIAGNOSIS — R10.84 GENERALIZED ABDOMINAL PAIN: ICD-10-CM

## 2025-05-31 PROCEDURE — 74177 CT ABD & PELVIS W/CONTRAST: CPT | Mod: 26,,, | Performed by: RADIOLOGY

## 2025-05-31 PROCEDURE — 25500020 PHARM REV CODE 255: Performed by: STUDENT IN AN ORGANIZED HEALTH CARE EDUCATION/TRAINING PROGRAM

## 2025-05-31 PROCEDURE — 74177 CT ABD & PELVIS W/CONTRAST: CPT | Mod: TC

## 2025-05-31 RX ADMIN — IOHEXOL 75 ML: 350 INJECTION, SOLUTION INTRAVENOUS at 10:05

## 2025-05-31 RX ADMIN — IOHEXOL 30 ML: 350 INJECTION, SOLUTION INTRAVENOUS at 10:05

## 2025-06-02 ENCOUNTER — RESULTS FOLLOW-UP (OUTPATIENT)
Dept: FAMILY MEDICINE | Facility: CLINIC | Age: 35
End: 2025-06-02

## 2025-06-05 ENCOUNTER — OFFICE VISIT (OUTPATIENT)
Dept: FAMILY MEDICINE | Facility: CLINIC | Age: 35
End: 2025-06-05
Payer: COMMERCIAL

## 2025-06-05 VITALS
SYSTOLIC BLOOD PRESSURE: 126 MMHG | OXYGEN SATURATION: 97 % | BODY MASS INDEX: 23.89 KG/M2 | HEIGHT: 65 IN | HEART RATE: 76 BPM | DIASTOLIC BLOOD PRESSURE: 78 MMHG | WEIGHT: 143.38 LBS | RESPIRATION RATE: 18 BRPM

## 2025-06-05 DIAGNOSIS — F90.9 ADULT ADHD: Primary | ICD-10-CM

## 2025-06-05 PROCEDURE — 3074F SYST BP LT 130 MM HG: CPT | Mod: CPTII,S$GLB,, | Performed by: FAMILY MEDICINE

## 2025-06-05 PROCEDURE — 3008F BODY MASS INDEX DOCD: CPT | Mod: CPTII,S$GLB,, | Performed by: FAMILY MEDICINE

## 2025-06-05 PROCEDURE — 1159F MED LIST DOCD IN RCRD: CPT | Mod: CPTII,S$GLB,, | Performed by: FAMILY MEDICINE

## 2025-06-05 PROCEDURE — 3078F DIAST BP <80 MM HG: CPT | Mod: CPTII,S$GLB,, | Performed by: FAMILY MEDICINE

## 2025-06-05 PROCEDURE — 99214 OFFICE O/P EST MOD 30 MIN: CPT | Mod: S$GLB,,, | Performed by: FAMILY MEDICINE

## 2025-06-05 PROCEDURE — 99999 PR PBB SHADOW E&M-EST. PATIENT-LVL V: CPT | Mod: PBBFAC,,, | Performed by: FAMILY MEDICINE

## 2025-06-05 RX ORDER — VILOXAZINE HYDROCHLORIDE 200 MG/1
200 CAPSULE, EXTENDED RELEASE ORAL DAILY
Qty: 30 CAPSULE | Refills: 2 | Status: SHIPPED | OUTPATIENT
Start: 2025-06-05

## 2025-06-05 NOTE — PROGRESS NOTES
Subjective:       Patient ID: Shagufta Bedoya is a 34 y.o. female.    Chief Complaint: Follow-up    History of Present Illness    CHIEF COMPLAINT:  Ms. Bedoya presents today for follow up of abdominal pain and ADHD medication management    ABDOMINAL PAIN:  She reports intermittent abdominal pain similar to previous gallbladder pain, which has become more persistent and frequent, eventually occurring all day. The pain is located in the mid-top back with radiation to the leg by end of day. She started Tremfya on the third with some improvement in pain. Despite performing a bowel cleanse on Sunday that was almost clear but not completely, the pain has persisted without complete resolution.    MEDICAL HISTORY:  Her history includes colonoscopy in 2023, gallbladder issues, and psoriatic arthritis affecting joints.    ADHD:  She reports current stimulant medication causes significant difficulty with morning awakening, noting it is overwhelmingly harder to wake up while on the medication compared to when not taking it. She expresses interest in non-stimulant alternatives. She recalls a prior trial of Strattera with a negative experience but cannot recall specific side effects.    LABS:  AST and ALT were elevated in April. Recent labs were drawn Wednesday morning with AST results pending.      ROS:  Gastrointestinal: +abdominal pain  Musculoskeletal: +joint pain, +back pain, +limb pain             Problem List[1]  Shagufta has a current medication list which includes the following prescription(s): amitriptyline, clindamycin, clobetasol, clobetasol, tremfya, hydrocortisone, hyoscyamine, isoniazid, ketoconazole, meloxicam, ondansetron, pyridoxine (vitamin b6), simpesse, triamcinolone acetonide 0.1%, buspirone, cyclobenzaprine, doxycycline, linzess, radiacream, tretinoin, and qelbree, and the following Facility-Administered Medications: ceFOXItin (MEFOXIN) 2 g in dextrose 5 % (D5W) 50 mL IVPB, sodium chloride 0.9%, and sodium  "chloride 0.9%.        Health Maintenance Due   Topic Date Due    HIV Screening  Never done    Mammogram  Never done    Pneumococcal Vaccines (Age 0-49) (1 of 2 - PCV) Never done    COVID-19 Vaccine (1 - 2024-25 season) Never done      Health Maintenance reviewed and discussed.   Objective:      Vitals:    06/05/25 1534   BP: 126/78   Pulse: 76   Resp: 18   SpO2: 97%   Weight: 65 kg (143 lb 6.4 oz)   Height: 5' 5" (1.651 m)   PainSc:   3   PainLoc: Head     Body mass index is 23.86 kg/m².  Physical Exam  Vitals and nursing note reviewed.   Constitutional:       General: She is not in acute distress.     Appearance: She is not ill-appearing.   Cardiovascular:      Rate and Rhythm: Normal rate and regular rhythm.      Heart sounds: No murmur heard.  Pulmonary:      Effort: Pulmonary effort is normal.      Breath sounds: Normal breath sounds. No wheezing.   Skin:     General: Skin is warm and dry.      Findings: No rash.   Neurological:      Mental Status: She is alert.   Psychiatric:         Mood and Affect: Mood normal.         Behavior: Behavior normal.                 Assessment:       Assessment & Plan    1. Reviewed CT Abdomen, noting fissural hypodensity likely representing fat deposition, not believed to be cause of abdominal pain.  2. Considered potential causes for pain relief with Tremfya (IL-23 inhibitor), though exact mechanism unclear.  3. Evaluated liver function, noting previously elevated AST and ALT in April, awaiting new results.  4. Considered GGT test to assess biliary origin if liver enzymes normalize.  5. Considered non-stimulant ADHD medication options due to difficulty with morning awakening on stimulants, including Qelbree as potential alternative, and reviewed past negative experience with Strattera.           Plan:       1. Adult ADHD  -     viloxazine (QELBREE) 200 mg Cp24; Take 1 capsule (200 mg total) by mouth once daily.  Dispense: 30 capsule; Refill: 2         This note was generated " with the assistance of ambient listening technology. Verbal consent was obtained by the patient and accompanying visitor(s) for the recording of patient appointment to facilitate this note. I attest to having reviewed and edited the generated note for accuracy, though some syntax or spelling errors may persist. Please contact the author of this note for any clarification.         [1]   Patient Active Problem List  Diagnosis    Episodic tension-type headache, not intractable    Fibromyalgia    Hypovitaminosis D    Iron deficiency anemia due to chronic blood loss    Adult ADHD    Primary insomnia    Menorrhagia with regular cycle    Allergy to sulfa drugs    Food allergy    Psoriasis    Chronic constipation

## 2025-06-23 DIAGNOSIS — N92.0 MENORRHAGIA WITH REGULAR CYCLE: ICD-10-CM

## 2025-06-23 RX ORDER — LEVONORGESTREL AND ETHINYL ESTRADIOL AND ETHINYL ESTRADIOL 150-30(84)
1 KIT ORAL
Qty: 91 TABLET | Refills: 3 | Status: SHIPPED | OUTPATIENT
Start: 2025-06-23

## 2025-06-23 NOTE — TELEPHONE ENCOUNTER
Refill Decision Note   Shagufta Bedoya  is requesting a refill authorization.  Brief Assessment and Rationale for Refill:  Approve     Medication Therapy Plan:        Comments:     Note composed:12:29 PM 06/23/2025

## 2025-06-23 NOTE — TELEPHONE ENCOUNTER
No care due was identified.  [facility] Embedded Care Due Messages. Reference number: [id number].   6/23/2025 11:49:17 AM CDT

## 2025-06-29 ENCOUNTER — E-VISIT (OUTPATIENT)
Dept: FAMILY MEDICINE | Facility: CLINIC | Age: 35
End: 2025-06-29
Payer: COMMERCIAL

## 2025-06-29 DIAGNOSIS — N93.9 VAGINAL BLEEDING: ICD-10-CM

## 2025-06-29 DIAGNOSIS — N93.0 PCB (POST COITAL BLEEDING): Primary | ICD-10-CM

## 2025-06-30 RX ORDER — KETOCONAZOLE 20 MG/ML
SHAMPOO, SUSPENSION TOPICAL
Qty: 120 ML | Refills: 11 | Status: SHIPPED | OUTPATIENT
Start: 2025-06-30

## 2025-06-30 NOTE — TELEPHONE ENCOUNTER
Refill Routing Note   Medication(s) are not appropriate for processing by [facility] for the following reason(s):        Outside of protocol    [facility] action(s):  Route               Appointments  past 12m or future 3m with PCP    Date Provider   Last Visit   5/30/2025 Magi Alcazar MD   Next Visit   Visit date not found Magi Alcazar MD   ED visits in past 90 days: 0        Note composed:11:19 AM 06/30/2025

## 2025-07-01 NOTE — PROGRESS NOTES
Patient ID: Shagufta Bedoya is a 34 y.o. female.        E-Visit Time Tracking:             Chief Complaint: General Illness (Entered automatically based on patient selection in [employer].)      The patient initiated a request through [employer] on 6/29/2025 for evaluation and management with a chief complaint of General Illness (Entered automatically based on patient selection in [employer].)     I evaluated the questionnaire submission on 07/01/2025.    [facility]    6/29/2025  6:29 PM CDT - Filed by Patient   What do you need help with? Vaginal Symptoms   Do you agree to participate in an E-Visit? Yes   If you have any of the following symptoms,  please do not complete an E-Visit,  schedule an appointment with your provider: I acknowledge   Do you have any of the following pregnancy-related conditions? (Pregnant, Possibly pregnant, Breast feeding, None) None   What is the main issue you would like addressed today? Bleeding after orgasm   Which of the following vaginal concerns do you have? Bleeding;  Pain   Do you have pain while passing urine? No   Do you have any of the following symptoms? None of the above    Have you taken antibiotics in the last two weeks? No    Do you use any of the following? No   Which of the following applies to your menstrual period? Expect soon   Which of the following applies to your menstrual cycle? Normal amount of bleeding   Do you have spotting between periods? Yes   Do you have pain with your period? No   Have you had similar symptoms in the past? No   Have you had a temperature of 100.4 or higher? No   Provide any information you feel is important to your history not asked above Vaginal bleeding after orgasm sometimes with intercouse, sometimes without penetration.   Please attach any relevant images or files    Are you able to take your vitals? No         No diagnosis found.     No orders of the defined types were placed in this encounter.           No  follow-ups on file.

## 2025-07-08 ENCOUNTER — OFFICE VISIT (OUTPATIENT)
Dept: OBSTETRICS AND GYNECOLOGY | Facility: CLINIC | Age: 35
End: 2025-07-08
Payer: COMMERCIAL

## 2025-07-08 VITALS
DIASTOLIC BLOOD PRESSURE: 74 MMHG | HEIGHT: 65 IN | SYSTOLIC BLOOD PRESSURE: 116 MMHG | BODY MASS INDEX: 23.99 KG/M2 | WEIGHT: 144 LBS

## 2025-07-08 DIAGNOSIS — N88.8 FRIABLE CERVIX: ICD-10-CM

## 2025-07-08 DIAGNOSIS — Z12.4 CERVICAL CANCER SCREENING: ICD-10-CM

## 2025-07-08 DIAGNOSIS — N93.0 PCB (POST COITAL BLEEDING): ICD-10-CM

## 2025-07-08 DIAGNOSIS — Z01.419 WOMEN'S ANNUAL ROUTINE GYNECOLOGICAL EXAMINATION: Primary | ICD-10-CM

## 2025-07-08 DIAGNOSIS — Z80.49 FAMILY HISTORY OF CERVICAL CANCER: ICD-10-CM

## 2025-07-08 PROCEDURE — 99999 PR PBB SHADOW E&M-EST. PATIENT-LVL IV: CPT | Mod: PBBFAC,,, | Performed by: OBSTETRICS & GYNECOLOGY

## 2025-07-08 NOTE — PROGRESS NOTES
Annual Well Woman's Exam  History & Physical      SUBJECTIVE:     History of Present Illness:  Patient is a 34 y.o. female presents for her annual exam. She reports post coital bleeding for the past 6 weeks. Her mother and sister have a h/o of cervical cancer. Her sister was diagnosed in her 20s. Her mother was diagnosed in her 70s. Both are in remission. Neither had a hysterectomy. They both underwent radiation.     Chief Complaint   Patient presents with    bleeding with sex        Review of patient's allergies indicates:   Allergen Reactions    Sulfa (sulfonamide antibiotics) Hives       Current Medications[1]  OB History          1    Para        Term                AB        Living   1         SAB        IAB        Ectopic        Multiple        Live Births                 Patient's last menstrual period was 2025.      Past Medical History:   Diagnosis Date    ADHD (attention deficit hyperactivity disorder)     Anemia     Anxiety     Anxiety state 2021 10:50:43 AM    [facility] Historical - Quick Add: Anxiety and depression-No Additional Notes    Attention deficit disorder 2021 11:14:06 AM    [facility] Historical - Other: ADD (attention deficit disorder)-No Additional Notes    Esophageal reflux 2021 11:14:13 AM    [facility] Historical - Unknown: Acid reflux disease-No Additional Notes    Fibromyalgia 2015    Fibromyalgia      Past Surgical History:   Procedure Laterality Date    AUGMENTATION OF BREAST  10/07/2022    COLONOSCOPY      COLONOSCOPY N/A 2023    Procedure: COLONOSCOPY;  Surgeon: Mirian Alford MD;  [facility];  Service: Endoscopy;  Laterality: N/A;    LAPAROSCOPIC APPENDECTOMY N/A 2023    Procedure: APPENDECTOMY, LAPAROSCOPIC;  Surgeon: Brodie Garcia MD;  Location: [facility] OR;  Service: General;  Laterality: N/A;    LAPAROSCOPIC CHOLECYSTECTOMY N/A 2023    Procedure: CHOLECYSTECTOMY-LAPAROSCOPIC;  Surgeon: Brodie RIVERA  "MD Jose;  Location: [facility] OR;  Service: General;  Laterality: N/A;    UPPER GASTROINTESTINAL ENDOSCOPY      WISDOM TOOTH EXTRACTION       Family History   Problem Relation Name Age of Onset    Colon cancer Mother  76    Cancer Mother          cervical     Cancer Sister          cervical    Colon polyps Neg Hx      Esophageal cancer Neg Hx      Stomach cancer Neg Hx       Social History[2]     OBJECTIVE:     Vital Signs (Most Recent)  BP: 116/74 (07/08/25 1415)  5' 5" (1.651 m)  65.3 kg (144 lb)     Physical Exam:  Physical Exam  Vitals reviewed.   Constitutional:       Appearance: Normal appearance.   HENT:      Head: Normocephalic.   Pulmonary:      Effort: Pulmonary effort is normal.   Abdominal:      General: Abdomen is flat.      Palpations: Abdomen is soft.   Genitourinary:     General: Normal vulva.      Vagina: Normal.      Cervix: Friability present. No cervical motion tenderness, discharge or lesion.      Uterus: Normal.       Adnexa: Right adnexa normal and left adnexa normal.            Comments: Friability present at 7:00. Made hemostatic with silver nitrate.   Skin:     General: Skin is warm and dry.   Neurological:      General: No focal deficit present.      Mental Status: She is alert and oriented to person, place, and time.   Psychiatric:         Mood and Affect: Mood normal.         Behavior: Behavior normal.         ASSESSMENT/PLAN:       ICD-10-CM ICD-9-CM   1. Women's annual routine gynecological examination  Z01.419 V72.31   2. PCB (post coital bleeding)  N93.0 626.7   3. Cervical cancer screening  Z12.4 V76.2   4. Family history of cervical cancer  Z80.49 V16.49   5. Friable cervix  N88.8 622.8       PLAN:    Pap today. HPV neg in 2024.   Friable cervix made hemostatic with Silver nitrate.   Bleeding may also be secondary to thin endometrium from long term use of OCPs.  Contraception: OCPs (continuous)  Follow up in 2 months to assess symptoms or sooner prn. If still present, consider " u/s.     Bridgett Raman MD, FACOG   Gynecology    [address]  [place] [address]    [phone]                    [1]   Current Outpatient Medications   Medication Sig Dispense Refill    amitriptyline (ELAVIL) 10 MG tablet TAKE 2 TABLETS(20 MG) BY MOUTH EVERY NIGHT AS NEEDED FOR INSOMNIA 180 tablet 3    clindamycin (CLEOCIN T) 1 % Swab Apply twice a day to affected areas for acne as needed 60 each 5    clobetasoL (TEMOVATE) 0.05 % cream Apply twice a day for flares to palms and soles x 2-3 wks only. 60 g 5    clobetasoL (TEMOVATE) 0.05 % external solution Apply to scalp and ears twice a day as needed x 3 wks or less 50 mL 5    cyclobenzaprine (FLEXERIL) 10 MG tablet Take 1 tablet (10 mg total) by mouth daily as needed for Muscle spasms. 30 tablet 0    doxycycline (VIBRAMYCIN) 100 MG Cap Take 1 tablet twice daily for acne flares.Take w/food and water. Stop if pregnant. 60 capsule 1    guselkumab (TREMFYA) 100 mg/mL AtIn INJECT 1 PEN UNDER THE SKIN AT WEEK 0 THEN WEEK 4 THEN EVERY 8 WEEKS THEREAFTER 1 each 2    hydrocortisone 2.5 % cream Apply twice a day to rash on face for 3 wks or less only 28 g 2    isoniazid (NYDRAZID) 300 MG Tab       ketoconazole (NIZORAL) 2 % shampoo APPLY TOPICALLY 2 TIMES A WEEK 120 mL 11    LINZESS 290 mcg Cap capsule TAKE 1 CAPSULE(290 MCG) BY MOUTH BEFORE BREAKFAST 30 capsule 11    meloxicam (MOBIC) 15 MG tablet Take 1 tablet (15 mg total) by mouth once daily. 30 tablet 1    ondansetron (ZOFRAN-ODT) 4 MG TbDL Take 1 tablet (4 mg total) by mouth every 8 (eight) hours as needed (nausea). 30 tablet 0    pyridoxine, vitamin B6, (B-6) 25 MG Tab Take 1 tablet by mouth once daily.      radiacream (VANICREAM) Apply 1-2 times a day for discoloration on face x 3 mo or less only 30 g 2    SIMPESSE 0.15 mg-30 mcg (84)/10 mcg (7) 3MPk TAKE 1 TABLET BY MOUTH EVERY DAY 91 tablet 3    tretinoin (RETIN-A) 0.025 % cream Apply pea-sized amount nightly as tolerated for acne. Stop if  pregnant. 45 g 11    triamcinolone acetonide 0.1% (KENALOG) 0.1 % cream Apply twice daily to rash on body only as needed for 3 wks or less. 453 g 1    viloxazine (QELBREE) 200 mg Cp24 Take 1 capsule (200 mg total) by mouth once daily. 30 capsule 2    busPIRone (BUSPAR) 5 MG Tab Take 1 tablet (5 mg total) by mouth 2 (two) times daily. (Patient not taking: Reported on 6/5/2025) 60 tablet 11    hyoscyamine (ANASPAZ,LEVSIN) 0.125 mg Tab Take 1 tablet (125 mcg total) by mouth every 4 (four) hours as needed. 120 tablet 1     Current Facility-Administered Medications   Medication Dose Route Frequency Provider Last Rate Last Admin    ceFOXItin (MEFOXIN) 2 g in dextrose 5 % (D5W) 50 mL IVPB  2 g Intravenous On Call Procedure Brodie Garcia MD        sodium chloride 0.9% bolus 1,000 mL 1,000 mL  1,000 mL Intravenous 1 time in Clinic/HOD Brodie Garcia MD        sodium chloride 0.9% bolus 1,000 mL 1,000 mL  1,000 mL Intravenous 1 time in Clinic/HOD Brodie Garcia MD       [2]   Social History  Tobacco Use    Smoking status: Never     Passive exposure: Never    Smokeless tobacco: Never   Substance Use Topics    Alcohol use: Yes     Comment: occasional     Drug use: No

## 2025-07-11 ENCOUNTER — PATIENT MESSAGE (OUTPATIENT)
Dept: OBSTETRICS AND GYNECOLOGY | Facility: CLINIC | Age: 35
End: 2025-07-11
Payer: COMMERCIAL

## 2025-07-11 ENCOUNTER — NURSE TRIAGE (OUTPATIENT)
Dept: ADMINISTRATIVE | Facility: CLINIC | Age: 35
End: 2025-07-11
Payer: COMMERCIAL

## 2025-07-11 NOTE — TELEPHONE ENCOUNTER
Patient had pelvic procedure on 7/8. States was told she may see the nitrate passed but was not told to expect vaginal bleeding. States bleeding started on 7/9, she does have to wear a kotex pad. Triage done- dispo see provider in 3 days. Unable to make appointment with OB, will send message to have office call her. Verb understanding of call back/ ED precautions.   Reason for Disposition   Bleeding or spotting after procedure (e.g., biopsy) or pelvic examination (e.g., pap smear) that lasts > 7 days    Additional Information   Negative: SEVERE vaginal bleeding (e.g., continuous red blood from vagina, or large blood clots) and very weak (can't stand)   Negative: Passed out (e.g., fainted, lost consciousness, blacked out and was not responding)   Negative: Difficult to awaken or acting confused (e.g., disoriented, slurred speech)   Negative: Shock suspected (e.g., cold/pale/clammy skin, too weak to stand, low BP, rapid pulse)   Negative: Sounds like a life-threatening emergency to the triager   Negative: SEVERE abdominal pain (e.g., excruciating)   Negative: SEVERE dizziness (e.g., unable to stand, requires support to walk, feels like passing out now)   Negative: SEVERE vaginal bleeding (e.g., soaking 2 pads or tampons per hour and present 2 or more hours; 1 menstrual cup every 2 hours)   Negative: Patient sounds very sick or weak to the triager   Negative: MODERATE vaginal bleeding (i.e., soaking pad or tampon per hour and present > 6 hours; 1 menstrual cup every 6 hours)   Negative: Constant abdominal pain lasting > 2 hours   Negative: Pale skin (pallor) of new-onset or getting worse   Negative: Skin bruises or nosebleed and not caused by an injury   Negative: Taking Coumadin (warfarin) or other strong blood thinner, or known bleeding disorder (e.g., thrombocytopenia)   Negative: Patient wants to be seen   Negative: Passed tissue (e.g., gray-white)    Protocols used: Vaginal Bleeding - [id number]

## 2025-07-28 ENCOUNTER — PATIENT MESSAGE (OUTPATIENT)
Dept: OBSTETRICS AND GYNECOLOGY | Facility: CLINIC | Age: 35
End: 2025-07-28
Payer: COMMERCIAL

## 2025-08-02 ENCOUNTER — OFFICE VISIT (OUTPATIENT)
Dept: URGENT CARE | Facility: CLINIC | Age: 35
End: 2025-08-02
Payer: COMMERCIAL

## 2025-08-02 VITALS
OXYGEN SATURATION: 100 % | SYSTOLIC BLOOD PRESSURE: 112 MMHG | TEMPERATURE: 98 F | BODY MASS INDEX: 23.99 KG/M2 | RESPIRATION RATE: 17 BRPM | WEIGHT: 144 LBS | HEART RATE: 93 BPM | HEIGHT: 65 IN | DIASTOLIC BLOOD PRESSURE: 79 MMHG

## 2025-08-02 DIAGNOSIS — N89.8 VAGINAL DISCHARGE: Primary | ICD-10-CM

## 2025-08-02 DIAGNOSIS — B37.31 CANDIDA VAGINITIS: ICD-10-CM

## 2025-08-02 LAB
B-HCG UR QL: NEGATIVE
BILIRUB UR QL STRIP: POSITIVE
CTP QC/QA: YES
GLUCOSE UR QL STRIP: NEGATIVE
KETONES UR QL STRIP: NEGATIVE
LEUKOCYTE ESTERASE UR QL STRIP: NEGATIVE
PH, POC UA: 6
POC BLOOD, URINE: NEGATIVE
POC NITRATES, URINE: NEGATIVE
PROT UR QL STRIP: NEGATIVE
SP GR UR STRIP: 1.02 (ref 1–1.03)
UROBILINOGEN UR STRIP-ACNC: 0.2 (ref 0.1–1.1)

## 2025-08-02 PROCEDURE — 81003 URINALYSIS AUTO W/O SCOPE: CPT | Mod: QW,S$GLB,, | Performed by: NURSE PRACTITIONER

## 2025-08-02 PROCEDURE — 81025 URINE PREGNANCY TEST: CPT | Mod: S$GLB,,, | Performed by: NURSE PRACTITIONER

## 2025-08-02 PROCEDURE — 99214 OFFICE O/P EST MOD 30 MIN: CPT | Mod: S$GLB,,, | Performed by: NURSE PRACTITIONER

## 2025-08-02 RX ORDER — METRONIDAZOLE 500 MG/1
500 TABLET ORAL EVERY 12 HOURS
Qty: 14 TABLET | Refills: 0 | Status: SHIPPED | OUTPATIENT
Start: 2025-08-02 | End: 2025-08-09

## 2025-08-02 RX ORDER — FLUCONAZOLE 150 MG/1
TABLET ORAL
Qty: 2 TABLET | Refills: 0 | Status: SHIPPED | OUTPATIENT
Start: 2025-08-02

## 2025-08-02 NOTE — PROGRESS NOTES
"Subjective:      Patient ID: Shagufta Bedoya is a 34 y.o. female.    Vitals:  height is 5' 5" (1.651 m) and weight is 65.3 kg (144 lb). Her oral temperature is 97.9 °F (36.6 °C). Her blood pressure is 112/79 and her pulse is 93. Her respiration is 17 and oxygen saturation is 100%.     Chief Complaint: Vaginal Discharge    Had gyn procedure to cauterize vessels.     Vaginal Discharge  The patient's primary symptoms include a genital odor and vaginal discharge. The patient's pertinent negatives include no pelvic pain. This is a new problem. The current episode started 1 to 4 weeks ago (14 days). The problem occurs constantly. The problem has been unchanged. The patient is experiencing no pain. She is not pregnant. Pertinent negatives include no dysuria, flank pain, frequency or urgency. The vaginal discharge was clear and green. There has been no bleeding. She has not been passing clots. She has not been passing tissue. Nothing aggravates the symptoms. She has tried nothing for the symptoms. The treatment provided no relief.       Constitution: Negative.   HENT: Negative.     Neck: neck negative.   Eyes: Negative.    Respiratory: Negative.     Gastrointestinal: Negative.    Endocrine: negative.   Genitourinary:  Positive for vaginal discharge and vaginal odor. Negative for dysuria, frequency, urgency, urine decreased, flank pain, vaginal pain, vaginal bleeding, painful intercourse and pelvic pain.   Musculoskeletal: Negative.    Skin: Negative.    Allergic/Immunologic: Negative.    Neurological: Negative.    Hematologic/Lymphatic: Negative.       Objective:     Physical Exam   Constitutional: She is oriented to person, place, and time. She appears well-developed.   HENT:   Head: Normocephalic and atraumatic.   Ears:   Right Ear: External ear normal.   Left Ear: External ear normal.   Nose: Nose normal. No nasal deformity. No epistaxis.   Mouth/Throat: Oropharynx is clear and moist and mucous membranes are normal. "   Eyes: Lids are normal.   Neck: Trachea normal and phonation normal. Neck supple.   Cardiovascular: Normal pulses.   Pulmonary/Chest: Effort normal.   Abdominal: Normal appearance and bowel sounds are normal. She exhibits no distension. Soft. There is no abdominal tenderness.   Genitourinary:    Pelvic exam was performed with patient in the lithotomy position.   There is no rash, tenderness or lesion on the right labia. There is no rash, tenderness or lesion on the left labia. Cervix exhibits no motion tenderness and no friability.    Vaginal discharge present.           Comments: Thick white cottage cheese like vag dc     Lymphadenopathy: No inguinal adenopathy noted on the right or left side.   Neurological: no focal deficit. She is alert and oriented to person, place, and time.   Skin: Skin is warm, dry and intact. Capillary refill takes less than 2 seconds.   Psychiatric: Her speech is normal and behavior is normal.   Nursing note and vitals reviewed.chaperone present       Assessment:     1. Vaginal discharge    2. Candida vaginitis        Plan:       Vaginal discharge  -     POCT urine pregnancy  -     POCT Urinalysis, Dipstick, Automated, W/O Scope  -     NuSwab Vaginitis Plus (VG+)  -     metroNIDAZOLE (FLAGYL) 500 MG tablet; Take 1 tablet (500 mg total) by mouth every 12 (twelve) hours. for 7 days  Dispense: 14 tablet; Refill: 0    Candida vaginitis  -     fluconazole (DIFLUCAN) 150 MG Tab; Start 1 tablet at onset of the symptoms followed by a 2nd tablet in 3 days  Dispense: 2 tablet; Refill: 0          Medical Decision Making:   Nuswab sent to lab, discussed with patient we will treat for BV due to her concerns regarding an vaginal odor despite not being appreciated today.  Will also treat for yeast based on physical exam today, office staff will call her with the results.     Nuswab done and sent to the lab, office staff will call with results in 3 to 5 days    Avoid taking baths    Recommend taking  showers    Use unscented are sensitive skin soap to wash her body with    Increase fluid intake    Avoid douches and scented panty liners    Increase water intake    Can take over-the-counter probiotics or eat yogurt 3-4 times per week    Follow up with your GYN or PCP for recurrent BV or vaginal discharge

## 2025-08-02 NOTE — PATIENT INSTRUCTIONS
Nuswab done and sent to the lab, office staff will call with results in 3 to 5 days    Avoid taking baths    Recommend taking showers    Use unscented are sensitive skin soap to wash her body with    Increase fluid intake    Avoid douches and scented panty liners    Increase water intake    Can take over-the-counter probiotics or eat yogurt 3-4 times per week    Follow up with your GYN or PCP for recurrent BV or vaginal discharge

## 2025-08-06 ENCOUNTER — TELEPHONE (OUTPATIENT)
Dept: URGENT CARE | Facility: CLINIC | Age: 35
End: 2025-08-06
Payer: COMMERCIAL

## 2025-08-06 LAB
A VAGINAE DNA VAG QL NAA+PROBE: NORMAL SCORE
BVAB2 DNA VAG QL NAA+PROBE: NORMAL SCORE
C ALBICANS DNA VAG QL NAA+PROBE: NEGATIVE
C GLABRATA DNA VAG QL NAA+PROBE: NEGATIVE
C TRACH DNA SPEC QL NAA+PROBE: NEGATIVE
MEGA1 DNA VAG QL NAA+PROBE: NORMAL SCORE
N GONORRHOEA DNA VAG QL NAA+PROBE: NEGATIVE
T VAGINALIS DNA VAG QL NAA+PROBE: NEGATIVE

## 2025-08-06 NOTE — TELEPHONE ENCOUNTER
Called pt to follow up with recent  visit and nuswab results. Pt states that she is doing better at this time. Pt denies any questions or concerns at this time.

## (undated) DEVICE — TROCAR ENDO Z THREAD KII 5X100

## (undated) DEVICE — APPLIER CLIP ENDO LIGAMAX 5MM

## (undated) DEVICE — CANISTER SUCTION RIGID 3000CC

## (undated) DEVICE — SUT 0 VICRYL / UR6 (J603)

## (undated) DEVICE — STAPLER SIGNIA TRI 2.0 ART 2.0

## (undated) DEVICE — SOL CLEARIFY VISUALIZATION LAP

## (undated) DEVICE — TUBING PNEUMO

## (undated) DEVICE — TROCAR KII BLLN 12MM 10CM

## (undated) DEVICE — SCISSOR 5MMX35CM DIRECT DRIVE

## (undated) DEVICE — SOL IRR NACL .9% 3000ML

## (undated) DEVICE — Device

## (undated) DEVICE — PAD SUREFIT GRND ELECTRD 10FT

## (undated) DEVICE — STAPLER GIA HANDLE STD

## (undated) DEVICE — GLOVE SURG ULTRA TOUCH 7.5

## (undated) DEVICE — IRRIGATOR SUCTION W/TIP

## (undated) DEVICE — SUT CTD VICRYL 3-0 CR/SH

## (undated) DEVICE — BLADE EZ CLEAN 2.5IN MODIFIED

## (undated) DEVICE — ELECTRODE LAP WIRE J-HOOK 36CM

## (undated) DEVICE — GOWN SURGICAL BRTHBL XXL

## (undated) DEVICE — ADHESIVE DERMABOND ADVANCED

## (undated) DEVICE — FILTER LAPAROSCOPIC SMOKE EVAC

## (undated) DEVICE — BAG TISS RETRV MONARCH 10MM

## (undated) DEVICE — GOWN POLY REINF X-LONG XL

## (undated) DEVICE — SEALER LIGASURE LAP 37CM 5MM

## (undated) DEVICE — GLOVE SURG ULTRA TOUCH 7

## (undated) DEVICE — CANNULA LAP SEAL Z THRD 5X100

## (undated) DEVICE — TRAY FOLEY 16FR LUBRISEAL IC

## (undated) DEVICE — SUT MONOCRYL 4-0 PS-2

## (undated) DEVICE — DRAPE ABDOMINAL TIBURON 14X11